# Patient Record
Sex: MALE | Race: WHITE
[De-identification: names, ages, dates, MRNs, and addresses within clinical notes are randomized per-mention and may not be internally consistent; named-entity substitution may affect disease eponyms.]

---

## 2017-06-12 ENCOUNTER — HOSPITAL ENCOUNTER (INPATIENT)
Dept: HOSPITAL 74 - YASAS | Age: 57
LOS: 5 days | Discharge: HOME | DRG: 897 | End: 2017-06-17
Attending: INTERNAL MEDICINE | Admitting: INTERNAL MEDICINE
Payer: COMMERCIAL

## 2017-06-12 VITALS — BODY MASS INDEX: 25.7 KG/M2

## 2017-06-12 DIAGNOSIS — F11.23: Primary | ICD-10-CM

## 2017-06-12 DIAGNOSIS — F19.24: ICD-10-CM

## 2017-06-12 DIAGNOSIS — Z79.84: ICD-10-CM

## 2017-06-12 DIAGNOSIS — Z79.4: ICD-10-CM

## 2017-06-12 DIAGNOSIS — F34.1: ICD-10-CM

## 2017-06-12 DIAGNOSIS — L02.413: ICD-10-CM

## 2017-06-12 DIAGNOSIS — F17.210: ICD-10-CM

## 2017-06-12 DIAGNOSIS — E78.5: ICD-10-CM

## 2017-06-12 DIAGNOSIS — L02.511: ICD-10-CM

## 2017-06-12 DIAGNOSIS — H04.129: ICD-10-CM

## 2017-06-12 DIAGNOSIS — B18.2: ICD-10-CM

## 2017-06-12 DIAGNOSIS — G47.00: ICD-10-CM

## 2017-06-12 DIAGNOSIS — I10: ICD-10-CM

## 2017-06-12 DIAGNOSIS — E11.9: ICD-10-CM

## 2017-06-12 PROCEDURE — HZ2ZZZZ DETOXIFICATION SERVICES FOR SUBSTANCE ABUSE TREATMENT: ICD-10-PCS | Performed by: INTERNAL MEDICINE

## 2017-06-12 RX ADMIN — Medication SCH MG: at 22:51

## 2017-06-12 RX ADMIN — NICOTINE SCH: 21 PATCH TRANSDERMAL at 22:55

## 2017-06-12 RX ADMIN — CEPHALEXIN SCH MG: 500 CAPSULE ORAL at 23:00

## 2017-06-12 NOTE — HP
COWS





- Scale


Resting Pulse: 1= [place] 


Sweatin= Chills/Flushing


Restless Observation: 1= Difficult to Sit Still


Pupil Size: 1= Pupils >than Normal


Bone or Joint Aches: 4=Acute Joint/Muscle Pain


Runny Nose/ Eye Tearin= Runny Nose/Eyes


GI Upset > 30mins: 1= Stomach Cramp


Tremor Observation: 2= Slight Tremor Visible


Yawning Observation: 2= >3x During Session


Anxiety or Irritability: 1=Feels Anxious/Irritable


Goose Flesh Skin: 0=Smooth Skin


COWS Score: 16





Admission ROS S





- HPI


Chief Complaint: 


C/O WITHDRAWAL SX'S. SEEKING DETOX FOR OPIATE DEPENDENCE.


Allergies/Adverse Reactions: 


 Allergies











Allergy/AdvReac Type Severity Reaction Status Date / Time


 


No Known Allergies Allergy   Verified 16 15:44











History of Present Illness: 


56 Y.O. MALE WITH H/O OPIOID DEPENDENCE ADMITTED TO DETOX. CLIENT IS KNOWN TO 

[facility]. REPORTS LAST DETOX 3 MONTHS AGO. STATES LONGEST CLEAN TIME 2 YEARS. SELF 

REFERRED.


CLIENT NOTED WITH ABCESS TO R FOREARM WITH SLIGHT NECROSIS AND REDNESS COOL TO 

TOUCH. STATES WAS SEEN IN [facility] TODAY AND STARTED ON PO ABT [place] BUT DOES NOT 

HAVE HIS DC PAPERS. PT HAS THIS WOUND LAST ADMISSION. STATES WAS TREATED AND 

RESOLVED BUT HAS NOW REOCCURRED DUE TO USING SITE FOR IVDA. THIS EPISODE IS NOW 

2 WEEKS. HE IS AFREBILE AND H/O DM. WILL START KEFLEX AND CONT TO MONITOR. D/W 

CLIENT NEED TO F/U UPON DC


Exam Limitations: No Limitations





- Ebola screening


Have you traveled outside of the country in the last 21 days: No (N)


Have you had contact with anyone from an Ebola affected area: No


Have you been sick,other than usual withdrawal symptoms: No


Do you have a fever: No





- Review of Systems


Constitutional: Chills, Loss of Appetite, Malaise, Night Sweats


EENT: reports: Tearing, Nose Congestion, Dental Problems (MISSING TEETH)


Respiratory: reports: No Symptoms reported


Cardiac: reports: No Symptoms Reported


GI: reports: Poor Appetite, Abdominal cramping


: reports: No Symptoms Reported


Musculoskeletal: reports: Joint Pain


Integumentary: reports: No Symptoms Reported


Neuro: reports: No Symptoms reported


Endocrine: reports: Other (H/O DM)


Hematology: reports: No Symptoms Reported


Psychiatric: reports: Anxious


Other Systems: Reviewed and Negative





Patient History





- Patient Medical History


Hx Anemia: No


Hx Asthma: No


Hx Chronic Obstructive Pulmonary Disease (COPD): No


Hx Cancer: No


Hx Cardiac Disorders: No


Hx Congestive Heart Failure: No


Hx Hypertension: Yes


Hx Hypercholesterolemia: Yes


Hx Pacemaker: No


HX Cerebrovascular Accident: No


Hx Seizures: No


Hx Dementia: No


Hx Diabetes: Yes


Hx Gastrointestinal Disorders: No


Hx Liver Disease: No


Hx Genitourinary Disorders: No


Hx Sexually Transmitted Disorders: No


Hx Renal Disease (ESRD): No


Hx Thyroid Disease: No


Hx Human Immunodeficiency Virus (HIV): No


Hx Hepatitis C: Yes (NON DETECTABLE)


Hx Depression: Yes


Hx Suicide Attempt: No


Hx Bipolar Disorder: No


Hx Schizophrenia: No


Other Medical History: DENIES





- Patient Surgical History


Past Surgical History: No


Hx Neurologic Surgery: No


Hx Cataract Extraction: No


Hx Cardiac Surgery: No


Hx Lung Surgery: No


Hx Breast Surgery: No


Hx Breast Biopsy: No


Hx Abdominal Surgery: No


Hx Appendectomy: No


Hx Cholecystectomy: No


Hx Genitourinary Surgery: No


Hx  Section: No


Hx Orthopedic Surgery: No


Anesthesia Reaction: No





- PPD History


Previous Implant?: Yes


Documented Results: Negative w/proof


Implanted On Prior R Admission?: Yes


Date: 16


Results: 0MM


PPD to be Administered?: No





- Smoking Cessation


Smoking history: Current every day smoker


Have you smoked in the past 12 months: Yes


Aproximately how many cigarettes per day: 10


Cigars Per Day: 0


Hx Chewing Tobacco Use: No


Initiated information on smoking cessation: Yes


'Breaking Loose' booklet given: 17





- Substance & Tx. History


Hx Alcohol Use: No


Hx Substance Use: Yes


Substance Use Type: Heroin


Hx Substance Use Treatment: Yes ([facility])





- Substances Abused


  ** HEROIN


Route: Injection


Frequency: Daily


Amount used: 3 BAGS


Age of first use: 42


Date of Last Use: 17





Family Disease History





- Family Disease History


Family Disease History: Diabetes: Father ()





Admission Physical Exam B[facility]





- Vital Signs


Vital Signs: 


 Vital Signs - 24 hr











  17





  20:19


 


Temperature 97.7 F


 


Pulse Rate 83


 


Respiratory 18





Rate 


 


Blood Pressure 108/69














- Physical


General Appearance: Yes: Tremorous


HEENTM: Yes: Other (GLASSES)


Respiratory: Yes: Chest Non-Tender, Lungs Clear, Normal Breath Sounds, No 

Respiratory Distress, No Accessory Muscle Use


Neck: Yes: No masses,lesions,Nodules, Supple, Trachea in good position


Breast: Yes: Breast Exam Deferred


Cardiology: Yes: Regular Rhythm, Regular Rate, S1, S2


Abdominal: Yes: Normal Bowel Sounds, Non Tender, Soft, Protuberent


Genitourinary: Yes: Within Normal Limits


Back: Yes: Normal Inspection


Musculoskeletal: Yes: full range of Motion, Gait Steady, Other (LIMITED ROM TO 

RIGHT HAND DIGITS)


Extremities: Yes: Non-Tender, Tremors, Other (LROM TO R HAND DIGITS)


Neurological: Yes: Fully Oriented, Alert


Integumentary: Yes: Normal Color, Clammy, Track Marks, Other (R FOREARM 

NECROTIC ABCESS. HARD NO DRAINAGE SLIGHT REDNESS)


Lymphatic: Yes: Within Normal Limits





- Diagnostic


(1) Dry eye


Current Visit: Yes   Status: Chronic





(2) Opioid dependence with withdrawal


Current Visit: Yes   Status: Chronic





(3) Hypertension


Current Visit: Yes   Status: Chronic   Qualifiers: 


     Hypertension type: essential hypertension        Qualified Code(s): I10 - 

Essential (primary) hypertension  





(4) Nicotine dependence


Current Visit: Yes   Status: Chronic   Qualifiers: 


     Nicotine product type: cigarettes     Substance use status: uncomplicated 

       Qualified Code(s): F17.210 - Nicotine dependence, cigarettes, 

uncomplicated  





(5) Type 2 diabetes mellitus


Current Visit: Yes   Status: Chronic   Qualifiers: 


     Diabetes mellitus complication status: without complication     Diabetes 

mellitus long term insulin use: with long term use        Qualified Code(s): 

E11.9 - Type 2 diabetes mellitus without complications  





(6) HLD (hyperlipidemia)


Current Visit: Yes   Status: Chronic   Qualifiers: 


     Hyperlipidemia type: unspecified        Qualified Code(s): E78.5 - 

Hyperlipidemia, unspecified  





(7) Abscess of right forearm


Current Visit: Yes   Status: Acute








Cleared for Admission [facility]





- Detox or Rehab


[facility] Level of Care: Medically Managed


Detox Regimen/Protocol: Methadone





[facility] Breath Alcohol Content


Breath Alcohol Content: 0





Urine Drug Screen





- Results


Drug Screen Negative: No


Urine Drug Screen Results: OPI-Opiates, MTD-Methadone

## 2017-06-13 LAB
ALBUMIN SERPL-MCNC: 3.9 G/DL (ref 3.4–5)
ALP SERPL-CCNC: 128 U/L (ref 45–117)
ALT SERPL-CCNC: 31 U/L (ref 12–78)
ANION GAP SERPL CALC-SCNC: 9 MMOL/L (ref 8–16)
APPEARANCE UR: CLEAR
AST SERPL-CCNC: 19 U/L (ref 15–37)
BILIRUB SERPL-MCNC: 0.7 MG/DL (ref 0.2–1)
BILIRUB UR STRIP.AUTO-MCNC: NEGATIVE MG/DL
CALCIUM SERPL-MCNC: 9.7 MG/DL (ref 8.5–10.1)
CO2 SERPL-SCNC: 28 MMOL/L (ref 21–32)
COCKROFT - GAULT: 97.57
COLOR UR: (no result)
CREAT SERPL-MCNC: 0.9 MG/DL (ref 0.7–1.3)
DEPRECATED RDW RBC AUTO: 14.3 % (ref 11.9–15.9)
GLUCOSE SERPL-MCNC: 158 MG/DL (ref 74–106)
KETONES UR QL STRIP: NEGATIVE
LEUKOCYTE ESTERASE UR QL STRIP.AUTO: NEGATIVE
MCH RBC QN AUTO: 28.4 PG (ref 25.7–33.7)
MCHC RBC AUTO-ENTMCNC: 32.6 G/DL (ref 32–35.9)
MCV RBC: 87.1 FL (ref 80–96)
NITRITE UR QL STRIP: NEGATIVE
PH UR: 5 [PH] (ref 5–8)
PLATELET # BLD AUTO: 228 K/MM3 (ref 134–434)
PMV BLD: 7.3 FL (ref 7.5–11.1)
PROT SERPL-MCNC: 7.7 G/DL (ref 6.4–8.2)
PROT UR QL STRIP: (no result)
PROT UR QL STRIP: NEGATIVE
RBC # UR STRIP: NEGATIVE /UL
UROBILINOGEN UR STRIP-MCNC: NEGATIVE E.U./DL (ref 0.2–1)
WBC # BLD AUTO: 6.3 K/MM3 (ref 4–10)

## 2017-06-13 RX ADMIN — CEPHALEXIN SCH MG: 500 CAPSULE ORAL at 23:04

## 2017-06-13 RX ADMIN — LOSARTAN POTASSIUM SCH MG: 25 TABLET, FILM COATED ORAL at 10:07

## 2017-06-13 RX ADMIN — METFORMIN HYDROCHLORIDE SCH MG: 500 TABLET ORAL at 16:47

## 2017-06-13 RX ADMIN — CEPHALEXIN SCH MG: 500 CAPSULE ORAL at 11:49

## 2017-06-13 RX ADMIN — INSULIN DETEMIR SCH UNITS: 100 INJECTION, SOLUTION SUBCUTANEOUS at 10:07

## 2017-06-13 RX ADMIN — Medication SCH TAB: at 10:07

## 2017-06-13 RX ADMIN — NICOTINE SCH: 21 PATCH TRANSDERMAL at 10:07

## 2017-06-13 RX ADMIN — METFORMIN HYDROCHLORIDE SCH MG: 500 TABLET ORAL at 08:06

## 2017-06-13 RX ADMIN — CEPHALEXIN SCH MG: 500 CAPSULE ORAL at 17:09

## 2017-06-13 RX ADMIN — CEPHALEXIN SCH MG: 500 CAPSULE ORAL at 05:22

## 2017-06-13 RX ADMIN — Medication SCH MG: at 22:10

## 2017-06-13 NOTE — EKG
Test Reason : 

Blood Pressure : ***/*** mmHG

Vent. Rate : 069 BPM     Atrial Rate : 069 BPM

   P-R Int : 142 ms          QRS Dur : 082 ms

    QT Int : 390 ms       P-R-T Axes : 043 084 069 degrees

   QTc Int : 417 ms

 

NORMAL SINUS RHYTHM

NORMAL ECG

NO PREVIOUS ECGS AVAILABLE

Confirmed by ELBA AGEE MD ([id number]) on 6/13/2017 10:58:08 AM

 

Referred By:             Confirmed By:ELBA AGEE MD

## 2017-06-13 NOTE — PN
[facility]HS COWS





- Scale


Resting Pulse: 0= [place] 80 or Below


Sweatin= Chills/Flushing


Restless Observation: 3= Extraneous Movement


Pupil Size: 2= Moderately Dilated


Bone or Joint Aches: 4=Acute Joint/Muscle Pain


Runny Nose/ Eye Tearin= Nasal Congestion


GI Upset > 30mins: 1= Stomach Cramp


Tremor Observation of Outstretched Hands: 1= Tremor Felt, Not Seen


Yawning Observation: 1= 1-2x During Session


Anxiety or Irritability: 2=Irritable/Anxious


Goose Flesh Skin: 0=Smooth Skin


COWS Score: 16





BHS Progress Note (SOAP)


Subjective: 


ANXIETY,SWEATS,INTERMITTENT SLEEP.


Objective: 





17 10:17


 Vital Signs











Temperature  97.5 F L  17 09:26


 


Pulse Rate  74   17 09:26


 


Respiratory Rate  18   17 09:26


 


Blood Pressure  116/78   17 09:26


 


O2 Sat by Pulse Oximetry (%)      








 Laboratory Last Values











WBC  6.3 K/mm3 (4.0-10.0)   17  07:00    


 


RBC  4.97 M/mm3 (4.00-5.60)   17  07:00    


 


Hgb  14.1 GM/dL (11.7-16.9)   17  07:00    


 


Hct  43.3 % (35.4-49)   17  07:00    


 


MCV  87.1 fl (80-96)   17  07:00    


 


MCHC  32.6 g/dl (32.0-35.9)   17  07:00    


 


RDW  14.3 % (11.9-15.9)   17  07:00    


 


Plt Count  228 K/MM3 (134-434)   17  07:00    


 


MPV  7.3 fl (7.5-11.1)  L D 17  07:00    


 


POC Glucometer  215 UNITS (())   17  05:24    











Assessment: 





17 10:17


WITHDRAWAL SX


Plan: 


CONTINUE DETOX

## 2017-06-14 LAB — SP GR UR: 1.02 (ref 1–1.02)

## 2017-06-14 RX ADMIN — NICOTINE SCH: 21 PATCH TRANSDERMAL at 10:04

## 2017-06-14 RX ADMIN — METFORMIN HYDROCHLORIDE SCH MG: 500 TABLET ORAL at 07:28

## 2017-06-14 RX ADMIN — CEPHALEXIN SCH MG: 500 CAPSULE ORAL at 17:09

## 2017-06-14 RX ADMIN — LOSARTAN POTASSIUM SCH MG: 25 TABLET, FILM COATED ORAL at 10:03

## 2017-06-14 RX ADMIN — Medication SCH MG: at 22:42

## 2017-06-14 RX ADMIN — CEPHALEXIN SCH MG: 500 CAPSULE ORAL at 11:04

## 2017-06-14 RX ADMIN — CEPHALEXIN SCH MG: 500 CAPSULE ORAL at 05:25

## 2017-06-14 RX ADMIN — CEPHALEXIN SCH MG: 500 CAPSULE ORAL at 23:25

## 2017-06-14 RX ADMIN — METFORMIN HYDROCHLORIDE SCH MG: 500 TABLET ORAL at 17:09

## 2017-06-14 RX ADMIN — Medication SCH TAB: at 10:03

## 2017-06-14 RX ADMIN — BACITRACIN ZINC SCH GM: 500 OINTMENT TOPICAL at 12:02

## 2017-06-14 RX ADMIN — INSULIN DETEMIR SCH UNITS: 100 INJECTION, SOLUTION SUBCUTANEOUS at 10:03

## 2017-06-14 RX ADMIN — BACITRACIN ZINC SCH GM: 500 OINTMENT TOPICAL at 22:06

## 2017-06-14 NOTE — PN
[facility] COWS





- Scale


Resting Pulse: 0= [place] 80 or Below


Sweatin= Chills/Flushing


Restless Observation: 3= Extraneous Movement


Pupil Size: 2= Moderately Dilated


Bone or Joint Aches: 2= Severe Diffuse Aches


Runny Nose/ Eye Tearin= Nasal Congestion


GI Upset > 30mins: 0= None


Tremor Observation of Outstretched Hands: 2= Slight Tremor Visible


Yawning Observation: 1= 1-2x During Session


Anxiety or Irritability: 2=Irritable/Anxious


Goose Flesh Skin: 0=Smooth Skin


COWS Score: 14





BHS Progress Note (SOAP)


Subjective: 


ANXIETY,SWEATS/CHILLS,FATIGUE


Objective: 





17 09:58


 Vital Signs











Temperature  96 F L  17 09:43


 


Pulse Rate  70   17 09:43


 


Respiratory Rate  20   17 09:43


 


Blood Pressure  130/78   17 09:43


 


O2 Sat by Pulse Oximetry (%)      








 Laboratory Last Values











WBC  6.3 K/mm3 (4.0-10.0)   17  07:00    


 


RBC  4.97 M/mm3 (4.00-5.60)   17  07:00    


 


Hgb  14.1 GM/dL (11.7-16.9)   17  07:00    


 


Hct  43.3 % (35.4-49)   17  07:00    


 


MCV  87.1 fl (80-96)   17  07:00    


 


MCHC  32.6 g/dl (32.0-35.9)   17  07:00    


 


RDW  14.3 % (11.9-15.9)   17  07:00    


 


Plt Count  228 K/MM3 (134-434)   17  07:00    


 


MPV  7.3 fl (7.5-11.1)  L D 17  07:00    


 


Sodium  136 mmol/L (136-145)   17  07:00    


 


Potassium  4.5 mmol/L (3.5-5.1)   17  07:00    


 


Chloride  99 mmol/L ()   17  07:00    


 


Carbon Dioxide  28 mmol/L (21-32)   17  07:00    


 


Anion Gap  9  (8-16)   17  07:00    


 


BUN  24 mg/dL (7-18)  H D 17  07:00    


 


Creatinine  0.9 mg/dL (0.7-1.3)   17  07:00    


 


Creat Clearance w eGFR  > 60  (>60)   17  07:00    


 


POC Glucometer  165 UNITS (())   17  05:27    


 


Random Glucose  158 mg/dL ()  H D 17  07:00    


 


Calcium  9.7 mg/dL (8.5-10.1)   17  07:00    


 


Total Bilirubin  0.7 mg/dL (0.2-1.0)   17  07:00    


 


AST  19 U/L (15-37)   17  07:00    


 


ALT  31 U/L (12-78)  D 17  07:00    


 


Alkaline Phosphatase  128 U/L ()  H  17  07:00    


 


Total Protein  7.7 g/dl (6.4-8.2)   17  07:00    


 


Albumin  3.9 g/dl (3.4-5.0)   17  07:00    


 


Urine Color  Ltyellow   17  13:23    


 


Urine Appearance  Clear   17  13:23    


 


Urine pH  5.0  (5.0-8.0)   17  13:23    


 


Urine Protein  Negative  (NEGATIVE)   17  13:23    


 


Urine Glucose (UA)  3+  (NEGATIVE)  H  17  13:23    


 


Urine Ketones  Negative  (NEGATIVE)   17  13:23    


 


Urine Blood  Negative  (NEGATIVE)   17  13:23    


 


Urine Nitrite  Negative  (NEGATIVE)   17  13:23    


 


Urine Bilirubin  Negative  (NEGATIVE)   17  13:23    


 


Urine Urobilinogen  Negative E.U./dl (0.2-1.0)   17  13:23    


 


Ur Leukocyte Esterase  Negative  (NEGATIVE)   17  13:23    


 


RPR Titer  Nonreactive  (NONREACTIVE)   17  07:00    


 


Hepatitis C Antibody  6.4 s/co ratio (0.0-0.9)  H  17  07:00    











Assessment: 





17 09:58


WITHDRAWAL SX


Plan: 


CONTINUE DETOX

## 2017-06-14 NOTE — CONSULT
[facility] Psychiatric Consult





- Data


Date of interview: 06/14/17


Admission source: [facility]


Identifying data: Another admission to [facility] for this 57 y/o  male 

seeking detox treatment on [address] for heroin dependence.Patient is single,a 

father of one,domiciled,unemployed and supported on SSD benefits.


Substance Abuse History: - Smoking Cessation.  Smoking history: Current every 

day smoker.  Have you smoked in the past 12 months: Yes.  Aproximately how many 

cigarettes per day: 10.  Cigars Per Day: 0.  Hx Chewing Tobacco Use: No.  

Initiated information on smoking cessation: Yes.  'Breaking Loose' booklet given

: 06/12/17.  - Substance & Tx. History.  Hx Alcohol Use: No.  Hx Substance Use: 

Yes.  Substance Use Type: Heroin.  Hx Substance Use Treatment: Yes ([facility]).  - 

Substances Abused.  ** HEROIN.  Route: Injection.  Frequency: Daily.  Amount 

used: 3 BAGS.  Age of first use: 42.  Date of Last Use: 06/11/17.  Confirmed by 

patient.


Medical History: Hypercholesterolemia,diabetes mellitus,hepatitis C and 

hypertension.


Psychiatric History: No reported history of psychiatric hospitalizations.Mr Kelsey reports current OPD psychiatric care at a program located on 

[facility] in the [place].Diagnosed with MDD and Anxiety 

Disorder.Currenntly maintained on klonopin and seroquel 200 mg/hs.He denies 

history of suicide attempts.


Physical/Sexual Abuse/Trauma History: Patient denies.


Additional Comment: Urine Drug Screen Results: OPI-Opiates, MTD-Methadone.Noted.





Mental Status Exam





- Mental Status Exam


Alert and Oriented to: Time, Place, Person


Cognitive Function: Good


Patient Appearance: Well Groomed


Mood: Nervous, Anxious


Affect: Mood Congruent


Patient Behavior: Fatigued, Appropriate, Cooperative


Speech Pattern: Clear


Voice Loudness: Normal


Thought Process: Goal Oriented


Thought Disorder: Not Present


Hallucinations: Denies


Suicidal Ideation: Denies


Homicidal Ideation: Denies


Insight/Judgement: Poor


Sleep: Poorly, Difficulty falling asleep


Appetite: Good


Muscle strength/Tone: Normal


Gait/Station: Normal





Psychiatric Findings





- Problem List (Axis 1, 2,3)


(1) Opioid dependence with withdrawal


Current Visit: Yes   Status: Acute





(2) Nicotine dependence


Current Visit: Yes   Status: Acute   Qualifiers: 


     Nicotine product type: cigarettes     Substance use status: uncomplicated 

       Qualified Code(s): F17.210 - Nicotine dependence, cigarettes, 

uncomplicated  





(3) Substance induced mood disorder


Current Visit: Yes   Status: Acute





(4) HLD (hyperlipidemia)


Current Visit: Yes   Status: Chronic   Qualifiers: 


     Hyperlipidemia type: unspecified        Qualified Code(s): E78.5 - 

Hyperlipidemia, unspecified  





(5) Hypertension


Current Visit: Yes   Status: Chronic   Qualifiers: 


     Hypertension type: essential hypertension        Qualified Code(s): I10 - 

Essential (primary) hypertension  





(6) Type 2 diabetes mellitus


Current Visit: Yes   Status: Chronic   Qualifiers: 


     Diabetes mellitus complication status: without complication     Diabetes 

mellitus long term insulin use: with long term use        Qualified Code(s): 

E11.9 - Type 2 diabetes mellitus without complications  





(7) Abscess of right hand


Current Visit: Yes   Status: Acute


Comment: KEFLEX








(8) Hepatitis C


Current Visit: Yes   Status: Chronic   Qualifiers: 


     Viral hepatitis chronicity: carrier        Qualified Code(s): B18.2 - 

Chronic viral hepatitis C  





(9) Insomnia


Current Visit: Yes   Status: Acute   








- Initial Treatment Plan


Initial Treatment Plan: Psychoeducation.Detoxification.Seroquel 200 mg po 

hs.Side effects/benefits discussed with patient.He is in agreement with this 

careplan.Observation.Pharmacy claims on 5/20/17 at [facility] confirmed 

this dose of seroquel.

## 2017-06-14 NOTE — CONSULT
Psychiatric Findings





- Problem List (Axis 1, 2,3)


(1) Opioid dependence with withdrawal


Current Visit: Yes   Status: Acute





(2) Nicotine dependence


Current Visit: Yes   Status: Acute   Qualifiers: 


     Nicotine product type: cigarettes     Substance use status: uncomplicated 

       Qualified Code(s): F17.210 - Nicotine dependence, cigarettes, 

uncomplicated  





(3) Substance induced mood disorder


Current Visit: Yes   Status: Acute





(4) HLD (hyperlipidemia)


Current Visit: Yes   Status: Chronic   Qualifiers: 


     Hyperlipidemia type: unspecified        Qualified Code(s): E78.5 - 

Hyperlipidemia, unspecified  





(5) Hypertension


Current Visit: Yes   Status: Chronic   Qualifiers: 


     Hypertension type: essential hypertension        Qualified Code(s): I10 - 

Essential (primary) hypertension  





(6) Type 2 diabetes mellitus


Current Visit: Yes   Status: Chronic   Qualifiers: 


     Diabetes mellitus complication status: without complication     Diabetes 

mellitus long term insulin use: with long term use        Qualified Code(s): 

E11.9 - Type 2 diabetes mellitus without complications  





(7) Abscess of right hand


Current Visit: Yes   Status: Acute


Comment: KEFLEX








(8) Hepatitis C


Current Visit: Yes   Status: Chronic   Qualifiers: 


     Viral hepatitis chronicity: carrier        Qualified Code(s): B18.2 - 

Chronic viral hepatitis C  





(9) Insomnia


Current Visit: Yes   Status: Acute

## 2017-06-15 RX ADMIN — BACITRACIN ZINC SCH GM: 500 OINTMENT TOPICAL at 22:49

## 2017-06-15 RX ADMIN — POLYVINYL ALCOHOL SCH DROP: 14 SOLUTION/ DROPS OPHTHALMIC at 13:37

## 2017-06-15 RX ADMIN — Medication SCH TAB: at 09:53

## 2017-06-15 RX ADMIN — METFORMIN HYDROCHLORIDE SCH MG: 500 TABLET ORAL at 07:10

## 2017-06-15 RX ADMIN — INSULIN DETEMIR SCH UNITS: 100 INJECTION, SOLUTION SUBCUTANEOUS at 09:52

## 2017-06-15 RX ADMIN — BACITRACIN ZINC SCH GM: 500 OINTMENT TOPICAL at 09:53

## 2017-06-15 RX ADMIN — CEPHALEXIN SCH MG: 500 CAPSULE ORAL at 17:15

## 2017-06-15 RX ADMIN — LOSARTAN POTASSIUM SCH MG: 25 TABLET, FILM COATED ORAL at 09:53

## 2017-06-15 RX ADMIN — NICOTINE SCH: 21 PATCH TRANSDERMAL at 09:54

## 2017-06-15 RX ADMIN — CEPHALEXIN SCH MG: 500 CAPSULE ORAL at 13:34

## 2017-06-15 RX ADMIN — CEPHALEXIN SCH MG: 500 CAPSULE ORAL at 05:37

## 2017-06-15 RX ADMIN — METFORMIN HYDROCHLORIDE SCH MG: 500 TABLET ORAL at 17:15

## 2017-06-15 RX ADMIN — Medication SCH MG: at 22:07

## 2017-06-15 RX ADMIN — POLYVINYL ALCOHOL SCH DROP: 14 SOLUTION/ DROPS OPHTHALMIC at 22:49

## 2017-06-16 RX ADMIN — BACITRACIN ZINC SCH GM: 500 OINTMENT TOPICAL at 22:07

## 2017-06-16 RX ADMIN — BACITRACIN ZINC SCH GM: 500 OINTMENT TOPICAL at 09:33

## 2017-06-16 RX ADMIN — POLYVINYL ALCOHOL SCH DROP: 14 SOLUTION/ DROPS OPHTHALMIC at 22:08

## 2017-06-16 RX ADMIN — CEPHALEXIN SCH MG: 500 CAPSULE ORAL at 23:08

## 2017-06-16 RX ADMIN — CEPHALEXIN SCH MG: 500 CAPSULE ORAL at 00:10

## 2017-06-16 RX ADMIN — CEPHALEXIN SCH MG: 500 CAPSULE ORAL at 05:38

## 2017-06-16 RX ADMIN — METFORMIN HYDROCHLORIDE SCH MG: 500 TABLET ORAL at 17:05

## 2017-06-16 RX ADMIN — Medication SCH TAB: at 09:34

## 2017-06-16 RX ADMIN — CEPHALEXIN SCH MG: 500 CAPSULE ORAL at 11:59

## 2017-06-16 RX ADMIN — METFORMIN HYDROCHLORIDE SCH MG: 500 TABLET ORAL at 06:15

## 2017-06-16 RX ADMIN — POLYVINYL ALCOHOL SCH: 14 SOLUTION/ DROPS OPHTHALMIC at 13:08

## 2017-06-16 RX ADMIN — INSULIN DETEMIR SCH UNITS: 100 INJECTION, SOLUTION SUBCUTANEOUS at 09:33

## 2017-06-16 RX ADMIN — NICOTINE SCH: 21 PATCH TRANSDERMAL at 09:34

## 2017-06-16 RX ADMIN — CEPHALEXIN SCH MG: 500 CAPSULE ORAL at 17:05

## 2017-06-16 RX ADMIN — LOSARTAN POTASSIUM SCH MG: 25 TABLET, FILM COATED ORAL at 10:24

## 2017-06-16 RX ADMIN — Medication SCH MG: at 22:08

## 2017-06-16 RX ADMIN — POLYVINYL ALCOHOL SCH DROP: 14 SOLUTION/ DROPS OPHTHALMIC at 05:38

## 2017-06-16 NOTE — PN
[facility] Progress Note (SOAP)


Subjective: 


ANXIETY,SWEATS,CHILLS,FATIGUE.


Objective: 





06/16/17 10:25


 Vital Signs











Temperature  99.3 F   06/16/17 09:50


 


Pulse Rate  75   06/16/17 09:50


 


Respiratory Rate  20   06/16/17 09:50


 


Blood Pressure  128/86   06/16/17 09:50


 


O2 Sat by Pulse Oximetry (%)      











Assessment: 





06/16/17 10:25


WITHDRAWAL SX


Plan: 


CONTINUE DETOX

## 2017-06-17 VITALS — DIASTOLIC BLOOD PRESSURE: 83 MMHG | SYSTOLIC BLOOD PRESSURE: 122 MMHG | TEMPERATURE: 97 F | HEART RATE: 72 BPM

## 2017-06-17 RX ADMIN — CEPHALEXIN SCH MG: 500 CAPSULE ORAL at 05:32

## 2017-06-17 RX ADMIN — METFORMIN HYDROCHLORIDE SCH MG: 500 TABLET ORAL at 06:05

## 2017-06-17 RX ADMIN — POLYVINYL ALCOHOL SCH DROP: 14 SOLUTION/ DROPS OPHTHALMIC at 05:32

## 2017-06-17 NOTE — DS
[facility] Detox Discharge Summary


Admission Date: 


06/12/17





Discharge Date: 06/17/17





- History


Present History: Opioid Dependence


Additional Comments: 


PATIENT ADVISED TO COMPLETE FULL COURSE OF ANTIBIOTIC (KELFLEX, PRESCRIPTION 

SENT TO PATIENT'S PHARMACY) FOR ABSCESS OF RIGHT ARM AFTER DISCHARGE. ADVISED 

PATIENT TO FOLLOW-UP WITH  AFTER DISCHARGE FROM DETOX FOR GENERAL MEDICAL 

ASSESSMENT AND FOR FOLLOW-UP MEDICAL EVALUATION OF ABSCESS OF RIGHT FOREARM.


Pertinent Past History: 


Hyperlipidemia, HTN, Depression, Hep C, Abscess of Right Forearm and Hand, Type 

II DM.





- Physical Exam Results


Vital Signs: 


 Vital Signs











Temperature  97.0 F L  06/17/17 06:33


 


Pulse Rate  72   06/17/17 06:33


 


Respiratory Rate  18   06/17/17 06:33


 


Blood Pressure  122/83   06/17/17 06:33


 


O2 Sat by Pulse Oximetry (%)      











Pertinent Admission Physical Exam Findings: 


WITHDRAWAL SYMPTOMS.





 Laboratory Tests











  06/12/17 06/12/17 06/13/17





  07:00 20:59 05:24


 


WBC   


 


RBC   


 


Hgb   


 


Hct   


 


MCV   


 


MCHC   


 


RDW   


 


Plt Count   


 


MPV   


 


Sodium   


 


Potassium   


 


Chloride   


 


Carbon Dioxide   


 


Anion Gap   


 


BUN   


 


Creatinine   


 


Creat Clearance w eGFR   


 


POC Glucometer   148  215


 


Random Glucose   


 


Calcium   


 


Total Bilirubin   


 


AST   


 


ALT   


 


Alkaline Phosphatase   


 


Total Protein   


 


Albumin   


 


Urine Color   


 


Urine Appearance   


 


Urine pH   


 


Ur Specific Gravity   


 


Urine Protein   


 


Urine Glucose (UA)   


 


Urine Ketones   


 


Urine Blood   


 


Urine Nitrite   


 


Urine Bilirubin   


 


Urine Urobilinogen   


 


Ur Leukocyte Esterase   


 


RPR Titer   


 


Hepatitis C Antibody  6.4 H  














  06/13/17 06/13/17 06/13/17





  07:00 07:00 07:00


 


WBC  6.3  


 


RBC  4.97  


 


Hgb  14.1  


 


Hct  43.3  


 


MCV  87.1  


 


MCHC  32.6  


 


RDW  14.3  


 


Plt Count  228  


 


MPV  7.3 L D  


 


Sodium   136 


 


Potassium   4.5 


 


Chloride   99 


 


Carbon Dioxide   28 


 


Anion Gap   9 


 


BUN   24 H D 


 


Creatinine   0.9 


 


Creat Clearance w eGFR   > 60 


 


POC Glucometer   


 


Random Glucose   158 H D 


 


Calcium   9.7 


 


Total Bilirubin   0.7 


 


AST   19 


 


ALT   31  D 


 


Alkaline Phosphatase   128 H 


 


Total Protein   7.7 


 


Albumin   3.9 


 


Urine Color   


 


Urine Appearance   


 


Urine pH   


 


Ur Specific Gravity   


 


Urine Protein   


 


Urine Glucose (UA)   


 


Urine Ketones   


 


Urine Blood   


 


Urine Nitrite   


 


Urine Bilirubin   


 


Urine Urobilinogen   


 


Ur Leukocyte Esterase   


 


RPR Titer    Nonreactive


 


Hepatitis C Antibody   














  06/13/17 06/13/17 06/14/17





  13:23 16:15 05:27


 


WBC   


 


RBC   


 


Hgb   


 


Hct   


 


MCV   


 


MCHC   


 


RDW   


 


Plt Count   


 


MPV   


 


Sodium   


 


Potassium   


 


Chloride   


 


Carbon Dioxide   


 


Anion Gap   


 


BUN   


 


Creatinine   


 


Creat Clearance w eGFR   


 


POC Glucometer   159  165


 


Random Glucose   


 


Calcium   


 


Total Bilirubin   


 


AST   


 


ALT   


 


Alkaline Phosphatase   


 


Total Protein   


 


Albumin   


 


Urine Color  Ltyellow  


 


Urine Appearance  Clear  


 


Urine pH  5.0  


 


Ur Specific Gravity  1.025  


 


Urine Protein  Negative  


 


Urine Glucose (UA)  3+ H  


 


Urine Ketones  Negative  


 


Urine Blood  Negative  


 


Urine Nitrite  Negative  


 


Urine Bilirubin  Negative  


 


Urine Urobilinogen  Negative  


 


Ur Leukocyte Esterase  Negative  


 


RPR Titer   


 


Hepatitis C Antibody   














  06/14/17 06/15/17 06/15/17





  16:14 05:39 16:17


 


WBC   


 


RBC   


 


Hgb   


 


Hct   


 


MCV   


 


MCHC   


 


RDW   


 


Plt Count   


 


MPV   


 


Sodium   


 


Potassium   


 


Chloride   


 


Carbon Dioxide   


 


Anion Gap   


 


BUN   


 


Creatinine   


 


Creat Clearance w eGFR   


 


POC Glucometer  246  175  230


 


Random Glucose   


 


Calcium   


 


Total Bilirubin   


 


AST   


 


ALT   


 


Alkaline Phosphatase   


 


Total Protein   


 


Albumin   


 


Urine Color   


 


Urine Appearance   


 


Urine pH   


 


Ur Specific Gravity   


 


Urine Protein   


 


Urine Glucose (UA)   


 


Urine Ketones   


 


Urine Blood   


 


Urine Nitrite   


 


Urine Bilirubin   


 


Urine Urobilinogen   


 


Ur Leukocyte Esterase   


 


RPR Titer   


 


Hepatitis C Antibody   














  06/16/17 06/16/17 06/17/17





  05:40 16:28 05:34


 


WBC   


 


RBC   


 


Hgb   


 


Hct   


 


MCV   


 


MCHC   


 


RDW   


 


Plt Count   


 


MPV   


 


Sodium   


 


Potassium   


 


Chloride   


 


Carbon Dioxide   


 


Anion Gap   


 


BUN   


 


Creatinine   


 


Creat Clearance w eGFR   


 


POC Glucometer  296  257  169


 


Random Glucose   


 


Calcium   


 


Total Bilirubin   


 


AST   


 


ALT   


 


Alkaline Phosphatase   


 


Total Protein   


 


Albumin   


 


Urine Color   


 


Urine Appearance   


 


Urine pH   


 


Ur Specific Gravity   


 


Urine Protein   


 


Urine Glucose (UA)   


 


Urine Ketones   


 


Urine Blood   


 


Urine Nitrite   


 


Urine Bilirubin   


 


Urine Urobilinogen   


 


Ur Leukocyte Esterase   


 


RPR Titer   


 


Hepatitis C Antibody   








LABS NOTED.





- Treatment


Hospital Course: Detox Protocol Followed, Detoxed Safely, Responded well, 

Discharged Condition Good


Patient has Accepted a Rehab Referral to: PATIENT TO GO HOME. WILL ATTEND [facility] OUTPATIENT PROGRAM.





- Medication


Discharge Medications: 


Ambulatory Orders





Insulin Glargine,Hum.rec.anlog [Lantus (10mL VIAL) -] 16 units SQ DAILY 11/25/ 16 


Losartan Potassium [Cozaar -] 25 mg PO DAILY 11/25/16 


Metformin HCl [Glucophage -] 500 mg PO BID 11/25/16 


Quirino/Polymyx B Sulf/Dexameth [Maxitrol Eye Drops -] 1 drop .ROUTE BID 11/25/16 


Quetiapine Fumarate [Seroquel -] 200 mg PO HS #30 tab 06/14/17 


Cephalexin [Keflex] 500 mg PO QID #28 capsule 06/17/17 











- Diagnosis


(1) Abscess of right forearm


Status: Acute





(2) Abscess of right hand


Status: Acute





(3) Insomnia


Status: Acute   Qualifiers: 


     Insomnia type: unspecified        Qualified Code(s): G47.00 - Insomnia, 

unspecified  





(4) Nicotine dependence


Status: Chronic   Qualifiers: 


     Nicotine product type: cigarettes     Substance use status: uncomplicated 

       Qualified Code(s): F17.210 - Nicotine dependence, cigarettes, 

uncomplicated  





(5) Opioid dependence with withdrawal


Status: Acute





(6) Substance induced mood disorder


Status: Acute





(7) Dry eye


Status: Chronic





(8) HLD (hyperlipidemia)


Status: Chronic   Qualifiers: 


     Hyperlipidemia type: unspecified        Qualified Code(s): E78.5 - 

Hyperlipidemia, unspecified  





(9) Hepatitis C


Status: Chronic   Qualifiers: 


     Viral hepatitis chronicity: carrier        Qualified Code(s): B18.2 - 

Chronic viral hepatitis C  





(10) Hypertension


Status: Chronic   Qualifiers: 


     Hypertension type: essential hypertension        Qualified Code(s): I10 - 

Essential (primary) hypertension  





(11) Type 2 diabetes mellitus


Status: Chronic   Qualifiers: 


     Diabetes mellitus complication status: without complication     Diabetes 

mellitus long term insulin use: with long term use        Qualified Code(s): 

E11.9 - Type 2 diabetes mellitus without complications  





(12) Depression (emotion)


Status: Suspected   Qualifiers: 


     Depression Type: dysthymia        Qualified Code(s): F34.1 - Dysthymic 

disorder  








- AMA


Did Patient Leave Against Medical Advice: No

## 2017-11-21 ENCOUNTER — HOSPITAL ENCOUNTER (INPATIENT)
Dept: HOSPITAL 74 - YASAS | Age: 57
LOS: 4 days | Discharge: HOME | DRG: 897 | End: 2017-11-25
Attending: INTERNAL MEDICINE | Admitting: INTERNAL MEDICINE
Payer: COMMERCIAL

## 2017-11-21 VITALS — BODY MASS INDEX: 25 KG/M2

## 2017-11-21 DIAGNOSIS — E78.5: ICD-10-CM

## 2017-11-21 DIAGNOSIS — Z79.4: ICD-10-CM

## 2017-11-21 DIAGNOSIS — F17.210: ICD-10-CM

## 2017-11-21 DIAGNOSIS — H04.129: ICD-10-CM

## 2017-11-21 DIAGNOSIS — F34.1: ICD-10-CM

## 2017-11-21 DIAGNOSIS — F11.23: Primary | ICD-10-CM

## 2017-11-21 DIAGNOSIS — E11.9: ICD-10-CM

## 2017-11-21 DIAGNOSIS — G47.00: ICD-10-CM

## 2017-11-21 DIAGNOSIS — Z79.84: ICD-10-CM

## 2017-11-21 DIAGNOSIS — H10.33: ICD-10-CM

## 2017-11-21 DIAGNOSIS — F19.24: ICD-10-CM

## 2017-11-21 DIAGNOSIS — I10: ICD-10-CM

## 2017-11-21 DIAGNOSIS — B18.2: ICD-10-CM

## 2017-11-21 LAB
APPEARANCE UR: CLEAR
BILIRUB UR STRIP.AUTO-MCNC: NEGATIVE MG/DL
COLOR UR: YELLOW
KETONES UR QL STRIP: NEGATIVE
NITRITE UR QL STRIP: NEGATIVE
PH UR: 5 [PH] (ref 5–8)
PROT UR QL STRIP: NEGATIVE
PROT UR QL STRIP: NEGATIVE
RBC # UR STRIP: NEGATIVE /UL
SP GR UR: 1.02 (ref 1–1.03)
UROBILINOGEN UR STRIP-MCNC: NEGATIVE MG/DL (ref 0.2–1)

## 2017-11-21 PROCEDURE — HZ2ZZZZ DETOXIFICATION SERVICES FOR SUBSTANCE ABUSE TREATMENT: ICD-10-PCS | Performed by: INTERNAL MEDICINE

## 2017-11-21 RX ADMIN — NICOTINE SCH: 21 PATCH TRANSDERMAL at 18:48

## 2017-11-21 RX ADMIN — METFORMIN HYDROCHLORIDE SCH MG: 500 TABLET ORAL at 18:47

## 2017-11-21 RX ADMIN — CIPROFLOXACIN HYDROCHLORIDE SCH: 3 SOLUTION/ DROPS OPHTHALMIC at 18:51

## 2017-11-21 RX ADMIN — Medication SCH MG: at 22:19

## 2017-11-21 RX ADMIN — CIPROFLOXACIN HYDROCHLORIDE SCH DROP: 3 SOLUTION/ DROPS OPHTHALMIC at 22:19

## 2017-11-21 RX ADMIN — CIPROFLOXACIN HYDROCHLORIDE SCH DROP: 3 SOLUTION/ DROPS OPHTHALMIC at 18:51

## 2017-11-21 NOTE — HP
COWS





- Scale


Resting Pulse: 1= HI 


Sweatin=Flushed/Facial Moisture


Restless Observation: 3= Extraneous Movement


Pupil Size: 2= Moderately Dilated


Bone or Joint Aches: 2= Severe Diffuse Aches


Runny Nose/ Eye Tearin= Runny Nose/Eyes


GI Upset > 30mins: 3= Vomiting/Diarrhea


Tremor Observation: 2= Slight Tremor Visible


Yawning Observation: 2= >3x During Session


Anxiety or Irritability: 2=Irritable/Anxious


Goose Flesh Skin: 0=Smooth Skin


COWS Score: 21





Admission ROS [facility]





- HPI


Chief Complaint: 





i need help to stop using heroin


Allergies/Adverse Reactions: 


 Allergies











Allergy/AdvReac Type Severity Reaction Status Date / Time


 


No Known Allergies Allergy   Verified 17 15:26











History of Present Illness: 





this 57 years old male with heroin dependence,seeking detox,last treatment [facility] 


bipolar disorder


type 2 dm


conjunctivitis both


longest period of sobriety 5 years


Exam Limitations: No Limitations





- Ebola screening


Have you traveled outside of the country in the last 21 days: No (N)


Have you had contact with anyone from an Ebola affected area: No


Have you been sick,other than usual withdrawal symptoms: No


Do you have a fever: No





- Review of Systems


Constitutional: Chills, Diaphoresis, Loss of Appetite, Malaise, Night Sweats, 

Weakness, Unintentional Wgt. Loss, Unexplained wgt Loss


EENT: reports: Tearing, Nose Congestion


Respiratory: reports: No Symptoms reported


Cardiac: reports: Palpitations


GI: reports: Diarrhea, Nausea, Vomiting, Abdominal cramping


: reports: No Symptoms Reported


Musculoskeletal: reports: Back Pain, Muscle Pain


Integumentary: reports: Dryness


Neuro: reports: Headache, Tremors


Endocrine: reports: No Symptoms Reported


Hematology: reports: No Symptoms Reported


Psychiatric: reports: No Sypmtoms Reported, Judgement Intact, Mood/Affect 

Appropiate, other (bipolar disorder)


Other Systems: Reviewed and Negative





Patient History





- Patient Medical History


Hx Anemia: No


Hx Asthma: No


Hx Chronic Obstructive Pulmonary Disease (COPD): No


Hx Cancer: No


Hx Cardiac Disorders: No


Hx Congestive Heart Failure: No


Hx Hypertension: Yes (on med)


Hx Hypercholesterolemia: Yes (on med)


Hx Pacemaker: No


HX Cerebrovascular Accident: No


Hx Seizures: No


Hx Dementia: No


Hx Diabetes: Yes (on med)


Hx Gastrointestinal Disorders: No


Hx Liver Disease: No


Hx Genitourinary Disorders: No


Hx Sexually Transmitted Disorders: No


Hx Renal Disease (ESRD): No


Hx Thyroid Disease: No


Hx Human Immunodeficiency Virus (HIV): No


Hx Hepatitis C: Yes (NON DETECTABLE)


Hx Depression: Yes (on med)


Hx Suicide Attempt: No


Hx Bipolar Disorder: No


Hx Schizophrenia: No


Other Medical History: no sucidal,no homicidal,redness both eyes for 3 days





- Patient Surgical History


Past Surgical History: No


Hx Neurologic Surgery: No


Hx Cataract Extraction: No


Hx Cardiac Surgery: No


Hx Lung Surgery: No


Hx Breast Surgery: No


Hx Breast Biopsy: No


Hx Abdominal Surgery: No


Hx Appendectomy: No


Hx Cholecystectomy: No


Hx Genitourinary Surgery: No


Hx  Section: No


Hx Orthopedic Surgery: No


Anesthesia Reaction: No





- PPD History


Previous Implant?: Yes


Documented Results: Negative w/o proof


Date: 16


Results: 0MM


PPD to be Administered?: Yes





- Smoking Cessation


Smoking history: Current every day smoker


Have you smoked in the past 12 months: Yes


Aproximately how many cigarettes per day: 10


Cigars Per Day: 0


Hx Chewing Tobacco Use: No


Initiated information on smoking cessation: Yes


'Breaking Loose' booklet given: 17





- Substance & Tx. History


Hx Alcohol Use: No


Hx Substance Use: Yes


Substance Use Type: Heroin


Hx Substance Use Treatment: Yes ([facility] 10/17)





- Substances Abused


  ** Heroin


Route: Injection


Frequency: Daily


Amount used: 6 BAGS


Age of first use: 42


Date of Last Use: 17





Family Disease History





- Family Disease History


Family Disease History: Diabetes: Father ()





Admission Physical Exam [facility]





- Vital Signs


Vital Signs: 


 Vital Signs - 24 hr











  17





  15:04


 


Temperature 97.8 F


 


Pulse Rate 83


 


Respiratory 18





Rate 


 


Blood Pressure 130/78














- Physical


General Appearance: Yes: Moderate Distress, Tremorous, Irritable, Sweating, 

Anxious


HEENTM: Yes: Normal ENT Inspection, VA, Pharynx Normal


Respiratory: Yes: Lungs Clear, Normal Breath Sounds, No Respiratory Distress


Neck: Yes: Within Normal Limits, Supple, Trachea in good position


Breast: Yes: Within Normal Limits


Cardiology: Yes: Within Normal Limits, Regular Rhythm, Regular Rate, S1, S2


Abdominal: Yes: Within Normal Limits, Normal Bowel Sounds, Non Tender, Flat, 

Soft


Genitourinary: Yes: Within Normal Limits


Back: Yes: Normal Inspection, Muscle Spasm


Musculoskeletal: Yes: full range of Motion, Back pain, Muscle Pain


Extremities: Yes: Within Normal Limits, Normal Range of Motion, Tremors


Neurological: Yes: CNs II-XII NML intact, Fully Oriented, Alert, Motor Strength 

5/5


Integumentary: Yes: Dry, Track Marks


Lymphatic: Yes: Within Normal Limits





- Diagnostic


(1) Insomnia


Current Visit: No   Status: Acute   


Qualifiers: 


   Insomnia type: unspecified   Qualified Code(s): G47.00 - Insomnia, 

unspecified   





(2) Opioid dependence with withdrawal


Current Visit: No   Status: Acute   





(3) HLD (hyperlipidemia)


Current Visit: No   Status: Chronic   


Qualifiers: 


   Hyperlipidemia type: unspecified   Qualified Code(s): E78.5 - Hyperlipidemia

, unspecified   





(4) Hepatitis C


Current Visit: No   Status: Chronic   


Qualifiers: 


   Viral hepatitis chronicity: carrier   Qualified Code(s): B18.2 - Chronic 

viral hepatitis C   





(5) Hypertension


Current Visit: No   Status: Chronic   


Qualifiers: 


   Hypertension type: essential hypertension   Qualified Code(s): I10 - 

Essential (primary) hypertension   





(6) Nicotine dependence


Current Visit: No   Status: Chronic   


Qualifiers: 


   Nicotine product type: cigarettes   Substance use status: uncomplicated   

Qualified Code(s): F17.210 - Nicotine dependence, cigarettes, uncomplicated   





(7) Type 2 diabetes mellitus


Current Visit: No   Status: Chronic   


Qualifiers: 


   Diabetes mellitus complication status: without complication   Diabetes 

mellitus long term insulin use: with long term use   Qualified Code(s): E11.9 - 

Type 2 diabetes mellitus without complications; Z79.4 - Long term (current) use 

of insulin; Z79.4 - Long term (current) use of insulin; Z79.4 - Long term (

current) use of insulin; Z79.4 - Long term (current) use of insulin   





(8) Depression (emotion)


Current Visit: No   Status: Suspected   


Qualifiers: 


   Depression Type: dysthymia   Qualified Code(s): F34.1 - Dysthymic disorder   





(9) Conjunctivitis, both eyes


Current Visit: Yes   Status: Acute   





Cleared for Admission [facility]





- Detox or Rehab


[facility] Level of Care: Medically Managed


Detox Regimen/Protocol: Methadone





S Breath Alcohol Content


Breath Alcohol Content: 0





Urine Drug Screen





- Results


Drug Screen Negative: No


Urine Drug Screen Results: OPI-Opiates

## 2017-11-22 LAB
ALBUMIN SERPL-MCNC: 3.1 G/DL (ref 3.4–5)
ALP SERPL-CCNC: 118 U/L (ref 45–117)
ALT SERPL-CCNC: 25 U/L (ref 12–78)
ANION GAP SERPL CALC-SCNC: 6 MMOL/L (ref 8–16)
AST SERPL-CCNC: 14 U/L (ref 15–37)
BILIRUB SERPL-MCNC: 0.5 MG/DL (ref 0.2–1)
CALCIUM SERPL-MCNC: 8.7 MG/DL (ref 8.5–10.1)
CO2 SERPL-SCNC: 27 MMOL/L (ref 21–32)
CREAT SERPL-MCNC: 0.7 MG/DL (ref 0.7–1.3)
DEPRECATED RDW RBC AUTO: 13.7 % (ref 11.9–15.9)
GLUCOSE SERPL-MCNC: 109 MG/DL (ref 74–106)
MCH RBC QN AUTO: 27.7 PG (ref 25.7–33.7)
MCHC RBC AUTO-ENTMCNC: 32 G/DL (ref 32–35.9)
MCV RBC: 86.6 FL (ref 80–96)
PLATELET # BLD AUTO: 240 K/MM3 (ref 134–434)
PMV BLD: 8.1 FL (ref 7.5–11.1)
PROT SERPL-MCNC: 6.6 G/DL (ref 6.4–8.2)
WBC # BLD AUTO: 5.8 K/MM3 (ref 4–10)

## 2017-11-22 RX ADMIN — CIPROFLOXACIN HYDROCHLORIDE SCH DROP: 3 SOLUTION/ DROPS OPHTHALMIC at 14:27

## 2017-11-22 RX ADMIN — CIPROFLOXACIN HYDROCHLORIDE SCH DROP: 3 SOLUTION/ DROPS OPHTHALMIC at 05:20

## 2017-11-22 RX ADMIN — Medication SCH MG: at 22:28

## 2017-11-22 RX ADMIN — METFORMIN HYDROCHLORIDE SCH MG: 500 TABLET ORAL at 17:14

## 2017-11-22 RX ADMIN — LOSARTAN POTASSIUM SCH MG: 25 TABLET, FILM COATED ORAL at 10:28

## 2017-11-22 RX ADMIN — METFORMIN HYDROCHLORIDE SCH MG: 500 TABLET ORAL at 06:03

## 2017-11-22 RX ADMIN — CIPROFLOXACIN HYDROCHLORIDE SCH DROP: 3 SOLUTION/ DROPS OPHTHALMIC at 10:30

## 2017-11-22 RX ADMIN — CIPROFLOXACIN HYDROCHLORIDE SCH DROP: 3 SOLUTION/ DROPS OPHTHALMIC at 22:28

## 2017-11-22 RX ADMIN — Medication SCH TAB: at 10:28

## 2017-11-22 RX ADMIN — INSULIN DETEMIR SCH UNITS: 100 INJECTION, SOLUTION SUBCUTANEOUS at 07:25

## 2017-11-22 RX ADMIN — NICOTINE SCH MG: 21 PATCH TRANSDERMAL at 10:29

## 2017-11-22 RX ADMIN — CIPROFLOXACIN HYDROCHLORIDE SCH DROP: 3 SOLUTION/ DROPS OPHTHALMIC at 17:14

## 2017-11-22 NOTE — EKG
Test Reason : 

Blood Pressure : ***/*** mmHG

Vent. Rate : 066 BPM     Atrial Rate : 066 BPM

   P-R Int : 170 ms          QRS Dur : 080 ms

    QT Int : 406 ms       P-R-T Axes : 059 084 075 degrees

   QTc Int : 425 ms

 

NORMAL SINUS RHYTHM WITH SINUS ARRHYTHMIA

NORMAL ECG

WHEN COMPARED WITH ECG OF 12-JUN-2017 21:58,

NO SIGNIFICANT CHANGE WAS FOUND

Confirmed by ANMOL BEJARANO MD ([id number]) on 11/22/2017 10:16:54 AM

 

Referred By:             Confirmed By:ANMOL BEJARANO MD

## 2017-11-22 NOTE — CONSULT
[facility] Psychiatric Consult





- Data


Date of interview: 11/22/17


Admission source: [facility]


Identifying data: Readmission to [facility] for this 56 y/o  male 

seeking detox treatment on [address] for heroin dependence.Patient is single,a 

father of one,domiciled,unemployed and supported on SSD benefits.


Substance Abuse History: Confirmed by patient in this interview.Refer to [facility] 

report for details.  Smoking history: Current every day smoker.  Have you 

smoked in the past 12 months: Yes.  Aproximately how many cigarettes per day: 

10.  Cigars Per Day: 0.  Hx Chewing Tobacco Use: No.  Initiated information on 

smoking cessation: Yes.  'Breaking Loose' booklet given: 11/21/17.  - Substance 

& Tx. History.  Hx Alcohol Use: No.  Hx Substance Use: Yes.  Substance Use Type

: Heroin.  Hx Substance Use Treatment: Yes ([facility] 10/17).  - Substances 

Abused.  ** Heroin.  Route: Injection.  Frequency: Daily.  Amount used: 6 BAGS.

  Age of first use: 42.  Date of Last Use: 11/20/17


Medical History: Hypercholesterolemia,diabetes mellitus,hepatitis C and 

hypertension.


Psychiatric History: In this interview,the patient admits to a history of 

multiple psychiatric hospitalizations ([facility]).Mr Low reports current [facility] psychiatric care in the [place].Name of 

program : not recalled by patient.Diagnosed with Bipolar Disorder.Prescribed 

klonopin and seroquel 200 mg/hs.Patient denies history of suicide attempts.


Physical/Sexual Abuse/Trauma History: No reported history of abuse.


Additional Comment: Urine Drug Screen Results: OPI-Opiates.Noted.





Mental Status Exam





- Mental Status Exam


Alert and Oriented to: Time, Place, Person


Cognitive Function: Good


Patient Appearance: Well Groomed


Mood: Hopeful, Euthymic


Affect: Appropriate, Normal Range


Patient Behavior: Appropriate, Cooperative


Speech Pattern: Clear


Voice Loudness: Normal


Thought Process: Intact, Goal Oriented


Thought Disorder: Not Present


Hallucinations: Denies


Suicidal Ideation: Denies


Homicidal Ideation: Denies


Insight/Judgement: Poor


Sleep: Poorly, Difficulty falling asleep


Appetite: Good


Muscle strength/Tone: Normal


Gait/Station: Normal





Psychiatric Findings





- Problem List (Axis 1, 2,3)


(1) Opioid dependence with withdrawal


Current Visit: Yes   Status: Acute   





(2) Nicotine dependence


Current Visit: Yes   Status: Chronic   


Qualifiers: 


   Nicotine product type: cigarettes   Substance use status: uncomplicated   

Qualified Code(s): F17.210 - Nicotine dependence, cigarettes, uncomplicated   





(3) Substance induced mood disorder


Current Visit: Yes   Status: Acute   





(4) Insomnia


Current Visit: Yes   Status: Acute   


Qualifiers: 


   Insomnia type: unspecified   Qualified Code(s): G47.00 - Insomnia, 

unspecified   





- Initial Treatment Plan


Initial Treatment Plan: Psychoeducation.Detoxification.Sleep 

hygiene.Medications : seroquel 200 mg po hs.Side effects/benefits discussed 

with patient.He agrees with careplan.Observation.

## 2017-11-22 NOTE — PN
[facility] CIWA





- CIWA Score


Nausea/Vomitin-No Nausea/No Vomiting


Muscle Tremors: 3


Anxiety: 4-Mod. Anxious/Guarded


Agitation: 3


Paroxysmal Sweats: 3


Orientation: 0-Oriented


Tacttile Disturbances: 1-Very Mild Itch/Numbness


Auditory Disturbances: 0-None


Visual Disturbances: 2-Mild Sensitivity


Headache: 0-None Present


CIWA-Ar Total Score: 16





[facility] Progress Note (SOAP)


Subjective: 





Interrupted Sleep, Fatigue, Anxious, Tremors.


Objective: 


PT. A & O X 3, OBSERVED AMBULATING ON UNIT. NO ACUTE DISTRESS.





17 11:22


 Vital Signs











Temperature  96.2 F L  17 09:40


 


Pulse Rate  76   17 09:40


 


Respiratory Rate  18   17 09:40


 


Blood Pressure  124/80   17 09:40


 


O2 Sat by Pulse Oximetry (%)      








 Laboratory Tests











  17





  15:39 22:00 05:18


 


WBC   


 


RBC   


 


Hgb   


 


Hct   


 


MCV   


 


MCH   


 


MCHC   


 


RDW   


 


Plt Count   


 


MPV   


 


Sodium   


 


Potassium   


 


Chloride   


 


Carbon Dioxide   


 


Anion Gap   


 


BUN   


 


Creatinine   


 


Creat Clearance w eGFR   


 


POC Glucometer  121   147


 


Random Glucose   


 


Calcium   


 


Total Bilirubin   


 


AST   


 


ALT   


 


Alkaline Phosphatase   


 


Total Protein   


 


Albumin   


 


Urine Color   Yellow 


 


Urine Appearance   Clear 


 


Urine pH   5.0 


 


Ur Specific Gravity   1.025 


 


Urine Protein   Negative 


 


Urine Glucose (UA)   Negative 


 


Urine Ketones   Negative 


 


Urine Blood   Negative 


 


Urine Nitrite   Negative 


 


Urine Bilirubin   Negative 


 


Urine Urobilinogen   Negative 


 


Ur Leukocyte Esterase   Negative 


 


RPR Titer   














  17





  07:00 07:00 07:00


 


WBC  5.8  


 


RBC  4.60  


 


Hgb  12.7  


 


Hct  39.8  


 


MCV  86.6  


 


MCH  27.7  


 


MCHC  32.0  


 


RDW  13.7  


 


Plt Count  240  


 


MPV  8.1  D  


 


Sodium   139 


 


Potassium   4.1 


 


Chloride   106 


 


Carbon Dioxide   27 


 


Anion Gap   6 L 


 


BUN   12  D 


 


Creatinine   0.7  D 


 


Creat Clearance w eGFR   > 60 


 


POC Glucometer   


 


Random Glucose   109 H D 


 


Calcium   8.7 


 


Total Bilirubin   0.5  D 


 


AST   14 L D 


 


ALT   25 


 


Alkaline Phosphatase   118 H 


 


Total Protein   6.6 


 


Albumin   3.1 L D 


 


Urine Color   


 


Urine Appearance   


 


Urine pH   


 


Ur Specific Gravity   


 


Urine Protein   


 


Urine Glucose (UA)   


 


Urine Ketones   


 


Urine Blood   


 


Urine Nitrite   


 


Urine Bilirubin   


 


Urine Urobilinogen   


 


Ur Leukocyte Esterase   


 


RPR Titer    Nonreactive








LABS NOTED.


Assessment: 





17 11:23


WITHDRAWAL SYMPTOMS.


Plan: 





CONTINUE DETOX.


INCREASE DAILY PO FLUID INTAKE.


PATIENT REPORTS THAT HE HAS IMPORTANT MEDICAL APPOINTMENT TO ATTEND ON SATURDAY

, 2017. AT PATIENT'S REQUEST, CURRENT DETOX REGIMEN (METHADONE) MODIFIED 

SO THAT PATIENT MAY BE DISCHARGED ON 2017.

## 2017-11-23 RX ADMIN — HYDROXYZINE PAMOATE PRN MG: 25 CAPSULE ORAL at 17:13

## 2017-11-23 RX ADMIN — CIPROFLOXACIN HYDROCHLORIDE SCH DROP: 3 SOLUTION/ DROPS OPHTHALMIC at 10:22

## 2017-11-23 RX ADMIN — CIPROFLOXACIN HYDROCHLORIDE SCH DROP: 3 SOLUTION/ DROPS OPHTHALMIC at 17:10

## 2017-11-23 RX ADMIN — METFORMIN HYDROCHLORIDE SCH MG: 500 TABLET ORAL at 07:00

## 2017-11-23 RX ADMIN — CIPROFLOXACIN HYDROCHLORIDE SCH DROP: 3 SOLUTION/ DROPS OPHTHALMIC at 13:47

## 2017-11-23 RX ADMIN — Medication SCH TAB: at 10:22

## 2017-11-23 RX ADMIN — HYDROXYZINE PAMOATE PRN MG: 25 CAPSULE ORAL at 22:18

## 2017-11-23 RX ADMIN — CIPROFLOXACIN HYDROCHLORIDE SCH DROP: 3 SOLUTION/ DROPS OPHTHALMIC at 05:40

## 2017-11-23 RX ADMIN — METFORMIN HYDROCHLORIDE SCH MG: 500 TABLET ORAL at 17:10

## 2017-11-23 RX ADMIN — INSULIN DETEMIR SCH UNITS: 100 INJECTION, SOLUTION SUBCUTANEOUS at 07:26

## 2017-11-23 RX ADMIN — Medication SCH MG: at 22:18

## 2017-11-23 RX ADMIN — LOSARTAN POTASSIUM SCH MG: 25 TABLET, FILM COATED ORAL at 10:23

## 2017-11-23 RX ADMIN — CIPROFLOXACIN HYDROCHLORIDE SCH DROP: 3 SOLUTION/ DROPS OPHTHALMIC at 22:18

## 2017-11-23 RX ADMIN — NICOTINE SCH MG: 21 PATCH TRANSDERMAL at 10:23

## 2017-11-23 NOTE — PN
[facility] COWS





- Scale


Resting Pulse: 0= [place] 80 or Below


Sweatin= Chills/Flushing


Restless Observation: 1= Difficult to Sit Still


Pupil Size: 0= Normal to Room Light


Bone or Joint Aches: 2= Severe Diffuse Aches


Runny Nose/ Eye Tearin= Nasal Congestion


GI Upset > 30mins: 0= None


Tremor Observation of Outstretched Hands: 2= Slight Tremor Visible


Yawning Observation: 1= 1-2x During Session


Anxiety or Irritability: 2=Irritable/Anxious


Goose Flesh Skin: 3=Piloerection


COWS Score: 13





BHS Progress Note (SOAP)


Subjective: 





Tremors, Anxious, Sweating, Fatigue.


Objective: 


PT. A & O X 3, OBSERVED AMBULATING ON UNIT. NO ACUTE DISTRESS.





17 14:19


 Vital Signs











Temperature  97.6 F   17 08:56


 


Pulse Rate  77   17 08:56


 


Respiratory Rate  18   17 08:56


 


Blood Pressure  145/85   17 08:56


 


O2 Sat by Pulse Oximetry (%)      








 Laboratory Tests











  17





  15:39 22:00 05:18


 


WBC   


 


RBC   


 


Hgb   


 


Hct   


 


MCV   


 


MCH   


 


MCHC   


 


RDW   


 


Plt Count   


 


MPV   


 


Sodium   


 


Potassium   


 


Chloride   


 


Carbon Dioxide   


 


Anion Gap   


 


BUN   


 


Creatinine   


 


Creat Clearance w eGFR   


 


POC Glucometer  121   147


 


Random Glucose   


 


Calcium   


 


Total Bilirubin   


 


AST   


 


ALT   


 


Alkaline Phosphatase   


 


Total Protein   


 


Albumin   


 


Urine Color   Yellow 


 


Urine Appearance   Clear 


 


Urine pH   5.0 


 


Ur Specific Gravity   1.025 


 


Urine Protein   Negative 


 


Urine Glucose (UA)   Negative 


 


Urine Ketones   Negative 


 


Urine Blood   Negative 


 


Urine Nitrite   Negative 


 


Urine Bilirubin   Negative 


 


Urine Urobilinogen   Negative 


 


Ur Leukocyte Esterase   Negative 


 


RPR Titer   














  17





  07:00 07:00 07:00


 


WBC  5.8  


 


RBC  4.60  


 


Hgb  12.7  


 


Hct  39.8  


 


MCV  86.6  


 


MCH  27.7  


 


MCHC  32.0  


 


RDW  13.7  


 


Plt Count  240  


 


MPV  8.1  D  


 


Sodium   139 


 


Potassium   4.1 


 


Chloride   106 


 


Carbon Dioxide   27 


 


Anion Gap   6 L 


 


BUN   12  D 


 


Creatinine   0.7  D 


 


Creat Clearance w eGFR   > 60 


 


POC Glucometer   


 


Random Glucose   109 H D 


 


Calcium   8.7 


 


Total Bilirubin   0.5  D 


 


AST   14 L D 


 


ALT   25 


 


Alkaline Phosphatase   118 H 


 


Total Protein   6.6 


 


Albumin   3.1 L D 


 


Urine Color   


 


Urine Appearance   


 


Urine pH   


 


Ur Specific Gravity   


 


Urine Protein   


 


Urine Glucose (UA)   


 


Urine Ketones   


 


Urine Blood   


 


Urine Nitrite   


 


Urine Bilirubin   


 


Urine Urobilinogen   


 


Ur Leukocyte Esterase   


 


RPR Titer    Nonreactive














  17





  16:30 05:39


 


WBC  


 


RBC  


 


Hgb  


 


Hct  


 


MCV  


 


MCH  


 


MCHC  


 


RDW  


 


Plt Count  


 


MPV  


 


Sodium  


 


Potassium  


 


Chloride  


 


Carbon Dioxide  


 


Anion Gap  


 


BUN  


 


Creatinine  


 


Creat Clearance w eGFR  


 


POC Glucometer  163  213


 


Random Glucose  


 


Calcium  


 


Total Bilirubin  


 


AST  


 


ALT  


 


Alkaline Phosphatase  


 


Total Protein  


 


Albumin  


 


Urine Color  


 


Urine Appearance  


 


Urine pH  


 


Ur Specific Gravity  


 


Urine Protein  


 


Urine Glucose (UA)  


 


Urine Ketones  


 


Urine Blood  


 


Urine Nitrite  


 


Urine Bilirubin  


 


Urine Urobilinogen  


 


Ur Leukocyte Esterase  


 


RPR Titer  








LABS NOTED.


Assessment: 





17 14:19


WITHDRAWAL SYMPTOMS.


Plan: 





CONTINUE DETOX.


INCREASE DAILY PO FLUID INTAKE.

## 2017-11-23 NOTE — EKG
Test Reason : 

Blood Pressure : ***/*** mmHG

Vent. Rate : 063 BPM     Atrial Rate : 063 BPM

   P-R Int : 186 ms          QRS Dur : 076 ms

    QT Int : 408 ms       P-R-T Axes : 040 074 067 degrees

   QTc Int : 417 ms

 

NORMAL SINUS RHYTHM

EARLY R WAVE PROGRESSION

WHEN COMPARED WITH ECG OF 21-NOV-2017 19:56,

NO SIGNIFICANT CHANGE WAS FOUND

Confirmed by TRACEY ALMARAZ MD ([id number]) on 11/23/2017 10:26:55 PM

 

Referred By:             Confirmed By:TRACEY ALMARAZ MD

## 2017-11-24 RX ADMIN — LOSARTAN POTASSIUM SCH MG: 25 TABLET, FILM COATED ORAL at 10:27

## 2017-11-24 RX ADMIN — Medication SCH TAB: at 10:27

## 2017-11-24 RX ADMIN — Medication SCH MG: at 22:08

## 2017-11-24 RX ADMIN — CIPROFLOXACIN HYDROCHLORIDE SCH DROP: 3 SOLUTION/ DROPS OPHTHALMIC at 10:27

## 2017-11-24 RX ADMIN — CIPROFLOXACIN HYDROCHLORIDE SCH DROP: 3 SOLUTION/ DROPS OPHTHALMIC at 14:52

## 2017-11-24 RX ADMIN — METFORMIN HYDROCHLORIDE SCH MG: 500 TABLET ORAL at 07:29

## 2017-11-24 RX ADMIN — CIPROFLOXACIN HYDROCHLORIDE SCH DROP: 3 SOLUTION/ DROPS OPHTHALMIC at 05:18

## 2017-11-24 RX ADMIN — HYDROXYZINE PAMOATE PRN MG: 25 CAPSULE ORAL at 22:09

## 2017-11-24 RX ADMIN — CIPROFLOXACIN HYDROCHLORIDE SCH DROP: 3 SOLUTION/ DROPS OPHTHALMIC at 18:27

## 2017-11-24 RX ADMIN — INSULIN DETEMIR SCH UNITS: 100 INJECTION, SOLUTION SUBCUTANEOUS at 07:28

## 2017-11-24 RX ADMIN — METFORMIN HYDROCHLORIDE SCH MG: 500 TABLET ORAL at 16:36

## 2017-11-24 RX ADMIN — HYDROXYZINE PAMOATE PRN MG: 25 CAPSULE ORAL at 10:30

## 2017-11-24 RX ADMIN — CIPROFLOXACIN HYDROCHLORIDE SCH DROP: 3 SOLUTION/ DROPS OPHTHALMIC at 22:09

## 2017-11-24 RX ADMIN — NICOTINE SCH MG: 21 PATCH TRANSDERMAL at 10:27

## 2017-11-24 NOTE — PN
[facility] Progress Note (SOAP)


Subjective: 





Anxious, Sweating.


Objective: 


PT. A & O X 3, OBSERVED AMBULATING ON UNIT. NO ACUTE DISTRESS.





11/24/17 12:14


 Vital Signs











Temperature  97.1 F L  11/24/17 10:00


 


Pulse Rate  82   11/24/17 10:00


 


Respiratory Rate  20   11/24/17 10:00


 


Blood Pressure  139/93   11/24/17 10:00


 


O2 Sat by Pulse Oximetry (%)      








 Laboratory Tests











  11/21/17 11/21/17 11/22/17





  15:39 22:00 05:18


 


WBC   


 


RBC   


 


Hgb   


 


Hct   


 


MCV   


 


MCH   


 


MCHC   


 


RDW   


 


Plt Count   


 


MPV   


 


Sodium   


 


Potassium   


 


Chloride   


 


Carbon Dioxide   


 


Anion Gap   


 


BUN   


 


Creatinine   


 


Creat Clearance w eGFR   


 


POC Glucometer  121   147


 


Random Glucose   


 


Calcium   


 


Total Bilirubin   


 


AST   


 


ALT   


 


Alkaline Phosphatase   


 


Total Protein   


 


Albumin   


 


Urine Color   Yellow 


 


Urine Appearance   Clear 


 


Urine pH   5.0 


 


Ur Specific Gravity   1.025 


 


Urine Protein   Negative 


 


Urine Glucose (UA)   Negative 


 


Urine Ketones   Negative 


 


Urine Blood   Negative 


 


Urine Nitrite   Negative 


 


Urine Bilirubin   Negative 


 


Urine Urobilinogen   Negative 


 


Ur Leukocyte Esterase   Negative 


 


RPR Titer   














  11/22/17 11/22/17 11/22/17





  07:00 07:00 07:00


 


WBC  5.8  


 


RBC  4.60  


 


Hgb  12.7  


 


Hct  39.8  


 


MCV  86.6  


 


MCH  27.7  


 


MCHC  32.0  


 


RDW  13.7  


 


Plt Count  240  


 


MPV  8.1  D  


 


Sodium   139 


 


Potassium   4.1 


 


Chloride   106 


 


Carbon Dioxide   27 


 


Anion Gap   6 L 


 


BUN   12  D 


 


Creatinine   0.7  D 


 


Creat Clearance w eGFR   > 60 


 


POC Glucometer   


 


Random Glucose   109 H D 


 


Calcium   8.7 


 


Total Bilirubin   0.5  D 


 


AST   14 L D 


 


ALT   25 


 


Alkaline Phosphatase   118 H 


 


Total Protein   6.6 


 


Albumin   3.1 L D 


 


Urine Color   


 


Urine Appearance   


 


Urine pH   


 


Ur Specific Gravity   


 


Urine Protein   


 


Urine Glucose (UA)   


 


Urine Ketones   


 


Urine Blood   


 


Urine Nitrite   


 


Urine Bilirubin   


 


Urine Urobilinogen   


 


Ur Leukocyte Esterase   


 


RPR Titer    Nonreactive














  11/22/17 11/23/17 11/23/17





  16:30 05:39 16:20


 


WBC   


 


RBC   


 


Hgb   


 


Hct   


 


MCV   


 


MCH   


 


MCHC   


 


RDW   


 


Plt Count   


 


MPV   


 


Sodium   


 


Potassium   


 


Chloride   


 


Carbon Dioxide   


 


Anion Gap   


 


BUN   


 


Creatinine   


 


Creat Clearance w eGFR   


 


POC Glucometer  163  213  268


 


Random Glucose   


 


Calcium   


 


Total Bilirubin   


 


AST   


 


ALT   


 


Alkaline Phosphatase   


 


Total Protein   


 


Albumin   


 


Urine Color   


 


Urine Appearance   


 


Urine pH   


 


Ur Specific Gravity   


 


Urine Protein   


 


Urine Glucose (UA)   


 


Urine Ketones   


 


Urine Blood   


 


Urine Nitrite   


 


Urine Bilirubin   


 


Urine Urobilinogen   


 


Ur Leukocyte Esterase   


 


RPR Titer   














  11/24/17





  05:17


 


WBC 


 


RBC 


 


Hgb 


 


Hct 


 


MCV 


 


MCH 


 


MCHC 


 


RDW 


 


Plt Count 


 


MPV 


 


Sodium 


 


Potassium 


 


Chloride 


 


Carbon Dioxide 


 


Anion Gap 


 


BUN 


 


Creatinine 


 


Creat Clearance w eGFR 


 


POC Glucometer  269


 


Random Glucose 


 


Calcium 


 


Total Bilirubin 


 


AST 


 


ALT 


 


Alkaline Phosphatase 


 


Total Protein 


 


Albumin 


 


Urine Color 


 


Urine Appearance 


 


Urine pH 


 


Ur Specific Gravity 


 


Urine Protein 


 


Urine Glucose (UA) 


 


Urine Ketones 


 


Urine Blood 


 


Urine Nitrite 


 


Urine Bilirubin 


 


Urine Urobilinogen 


 


Ur Leukocyte Esterase 


 


RPR Titer 








LABS NOTED.


Assessment: 





11/24/17 12:14


WITHDRAWAL SYMPTOMS


Plan: 





CONTINUE DETOX.


INCREASE DAILY PO FLUID INTAKE.

## 2017-11-25 VITALS — SYSTOLIC BLOOD PRESSURE: 147 MMHG | TEMPERATURE: 96.9 F | HEART RATE: 62 BPM | DIASTOLIC BLOOD PRESSURE: 89 MMHG

## 2017-11-25 RX ADMIN — CIPROFLOXACIN HYDROCHLORIDE SCH DROP: 3 SOLUTION/ DROPS OPHTHALMIC at 05:42

## 2017-11-25 RX ADMIN — INSULIN DETEMIR SCH UNITS: 100 INJECTION, SOLUTION SUBCUTANEOUS at 06:33

## 2017-11-25 RX ADMIN — METFORMIN HYDROCHLORIDE SCH MG: 500 TABLET ORAL at 06:32

## 2017-11-25 NOTE — DS
[facility] Detox Discharge Summary


Admission Date: 


11/21/17





Discharge Date: 11/25/17





- History


Present History: Opioid Dependence


Additional Comments: 





PATIENT GOING HOME AT THIS TIME. PATIENT ADVISED TO CONSIDER LOCAL 12-STEP /NA/ 

[facility] OUTPATIENT SUPPORT GROUP MEETINGS FOR AFTERCARE. PATIENT WAS DISCHARGED FROM 

DETOX UNIT IN STABLE MEDICAL CONDITION.


Pertinent Past History: 





Hep C, HTN, Hyperlipidemia, Type II DM, Insomnia, Depression, Nicotine 

Dependence, History of Conjunctivitis of both Eyes.





- Physical Exam Results


Vital Signs: 


 Vital Signs











Temperature  96.9 F L  11/25/17 06:02


 


Pulse Rate  62   11/25/17 06:02


 


Respiratory Rate  18   11/25/17 06:02


 


Blood Pressure  147/89   11/25/17 06:02


 


O2 Sat by Pulse Oximetry (%)      











Pertinent Admission Physical Exam Findings: 





WITHDRAWAL SYMPTOMS.





 Laboratory Tests











  11/21/17 11/21/17 11/22/17





  15:39 22:00 05:18


 


WBC   


 


RBC   


 


Hgb   


 


Hct   


 


MCV   


 


MCH   


 


MCHC   


 


RDW   


 


Plt Count   


 


MPV   


 


Sodium   


 


Potassium   


 


Chloride   


 


Carbon Dioxide   


 


Anion Gap   


 


BUN   


 


Creatinine   


 


Creat Clearance w eGFR   


 


POC Glucometer  121   147


 


Random Glucose   


 


Calcium   


 


Total Bilirubin   


 


AST   


 


ALT   


 


Alkaline Phosphatase   


 


Total Protein   


 


Albumin   


 


Urine Color   Yellow 


 


Urine Appearance   Clear 


 


Urine pH   5.0 


 


Ur Specific Gravity   1.025 


 


Urine Protein   Negative 


 


Urine Glucose (UA)   Negative 


 


Urine Ketones   Negative 


 


Urine Blood   Negative 


 


Urine Nitrite   Negative 


 


Urine Bilirubin   Negative 


 


Urine Urobilinogen   Negative 


 


Ur Leukocyte Esterase   Negative 


 


RPR Titer   














  11/22/17 11/22/17 11/22/17





  07:00 07:00 07:00


 


WBC  5.8  


 


RBC  4.60  


 


Hgb  12.7  


 


Hct  39.8  


 


MCV  86.6  


 


MCH  27.7  


 


MCHC  32.0  


 


RDW  13.7  


 


Plt Count  240  


 


MPV  8.1  D  


 


Sodium   139 


 


Potassium   4.1 


 


Chloride   106 


 


Carbon Dioxide   27 


 


Anion Gap   6 L 


 


BUN   12  D 


 


Creatinine   0.7  D 


 


Creat Clearance w eGFR   > 60 


 


POC Glucometer   


 


Random Glucose   109 H D 


 


Calcium   8.7 


 


Total Bilirubin   0.5  D 


 


AST   14 L D 


 


ALT   25 


 


Alkaline Phosphatase   118 H 


 


Total Protein   6.6 


 


Albumin   3.1 L D 


 


Urine Color   


 


Urine Appearance   


 


Urine pH   


 


Ur Specific Gravity   


 


Urine Protein   


 


Urine Glucose (UA)   


 


Urine Ketones   


 


Urine Blood   


 


Urine Nitrite   


 


Urine Bilirubin   


 


Urine Urobilinogen   


 


Ur Leukocyte Esterase   


 


RPR Titer    Nonreactive














  11/22/17 11/23/17 11/23/17





  16:30 05:39 16:20


 


WBC   


 


RBC   


 


Hgb   


 


Hct   


 


MCV   


 


MCH   


 


MCHC   


 


RDW   


 


Plt Count   


 


MPV   


 


Sodium   


 


Potassium   


 


Chloride   


 


Carbon Dioxide   


 


Anion Gap   


 


BUN   


 


Creatinine   


 


Creat Clearance w eGFR   


 


POC Glucometer  163  213  268


 


Random Glucose   


 


Calcium   


 


Total Bilirubin   


 


AST   


 


ALT   


 


Alkaline Phosphatase   


 


Total Protein   


 


Albumin   


 


Urine Color   


 


Urine Appearance   


 


Urine pH   


 


Ur Specific Gravity   


 


Urine Protein   


 


Urine Glucose (UA)   


 


Urine Ketones   


 


Urine Blood   


 


Urine Nitrite   


 


Urine Bilirubin   


 


Urine Urobilinogen   


 


Ur Leukocyte Esterase   


 


RPR Titer   














  11/24/17 11/24/17 11/24/17





  05:17 16:34 21:41


 


WBC   


 


RBC   


 


Hgb   


 


Hct   


 


MCV   


 


MCH   


 


MCHC   


 


RDW   


 


Plt Count   


 


MPV   


 


Sodium   


 


Potassium   


 


Chloride   


 


Carbon Dioxide   


 


Anion Gap   


 


BUN   


 


Creatinine   


 


Creat Clearance w eGFR   


 


POC Glucometer  269  314  243


 


Random Glucose   


 


Calcium   


 


Total Bilirubin   


 


AST   


 


ALT   


 


Alkaline Phosphatase   


 


Total Protein   


 


Albumin   


 


Urine Color   


 


Urine Appearance   


 


Urine pH   


 


Ur Specific Gravity   


 


Urine Protein   


 


Urine Glucose (UA)   


 


Urine Ketones   


 


Urine Blood   


 


Urine Nitrite   


 


Urine Bilirubin   


 


Urine Urobilinogen   


 


Ur Leukocyte Esterase   


 


RPR Titer   














  11/25/17





  05:40


 


WBC 


 


RBC 


 


Hgb 


 


Hct 


 


MCV 


 


MCH 


 


MCHC 


 


RDW 


 


Plt Count 


 


MPV 


 


Sodium 


 


Potassium 


 


Chloride 


 


Carbon Dioxide 


 


Anion Gap 


 


BUN 


 


Creatinine 


 


Creat Clearance w eGFR 


 


POC Glucometer  168


 


Random Glucose 


 


Calcium 


 


Total Bilirubin 


 


AST 


 


ALT 


 


Alkaline Phosphatase 


 


Total Protein 


 


Albumin 


 


Urine Color 


 


Urine Appearance 


 


Urine pH 


 


Ur Specific Gravity 


 


Urine Protein 


 


Urine Glucose (UA) 


 


Urine Ketones 


 


Urine Blood 


 


Urine Nitrite 


 


Urine Bilirubin 


 


Urine Urobilinogen 


 


Ur Leukocyte Esterase 


 


RPR Titer 








LABS NOTED.





- Treatment


Hospital Course: Detox Protocol Followed, Detoxed Safely, Responded well, 

Discharged Condition Good


Patient has Accepted a Rehab Referral to: PT GOING HOME, ADVISED TO CONSIDER 

LOCAL 12-STEP/NA SUPPORT GROUPS.





- Medication


Discharge Medications: 


Ambulatory Orders





Insulin Glargine,Hum.rec.anlog [Lantus (10mL VIAL) -] 16 units SQ DAILY 11/25/ 16 


Losartan Potassium [Cozaar -] 25 mg PO DAILY 11/25/16 


Metformin HCl [Glucophage -] 500 mg PO BID 11/25/16 


Quetiapine Fumarate [Seroquel -] 200 mg PO HS #30 tab 06/14/17 


Quetiapine Fumarate [Seroquel -] 200 mg PO HS #30 tab 11/22/17 











- Diagnosis


(1) Opioid dependence with withdrawal


Status: Acute   





(2) Conjunctivitis, both eyes


Status: Acute   


Qualifiers: 


   Conjunctivitis type: acute   Acute conjunctivitis type: unspecified   

Qualified Code(s): H10.33 - Unspecified acute conjunctivitis, bilateral   





(3) Insomnia


Status: Acute   


Qualifiers: 


   Insomnia type: unspecified   Qualified Code(s): G47.00 - Insomnia, 

unspecified   





(4) Substance induced mood disorder


Status: Acute   





(5) HLD (hyperlipidemia)


Status: Chronic   


Qualifiers: 


   Hyperlipidemia type: unspecified   Qualified Code(s): E78.5 - Hyperlipidemia

, unspecified   





(6) Hypertension


Status: Chronic   


Qualifiers: 


   Hypertension type: essential hypertension   Qualified Code(s): I10 - 

Essential (primary) hypertension   





(7) Nicotine dependence


Status: Chronic   


Qualifiers: 


   Nicotine product type: cigarettes   Substance use status: uncomplicated   

Qualified Code(s): F17.210 - Nicotine dependence, cigarettes, uncomplicated   





(8) Type 2 diabetes mellitus


Status: Chronic   


Qualifiers: 


   Diabetes mellitus complication status: without complication   Diabetes 

mellitus long term insulin use: with long term use   Qualified Code(s): E11.9 - 

Type 2 diabetes mellitus without complications; Z79.4 - Long term (current) use 

of insulin; Z79.4 - Long term (current) use of insulin; Z79.4 - Long term (

current) use of insulin; Z79.4 - Long term (current) use of insulin   





(9) Depression (emotion)


Status: Suspected   


Qualifiers: 


   Depression Type: dysthymia   Qualified Code(s): F34.1 - Dysthymic disorder   





- AMA


Did Patient Leave Against Medical Advice: No

## 2018-03-20 ENCOUNTER — HOSPITAL ENCOUNTER (INPATIENT)
Dept: HOSPITAL 74 - YASAS | Age: 58
LOS: 4 days | Discharge: HOME | DRG: 897 | End: 2018-03-24
Attending: INTERNAL MEDICINE | Admitting: INTERNAL MEDICINE
Payer: COMMERCIAL

## 2018-03-20 DIAGNOSIS — I10: ICD-10-CM

## 2018-03-20 DIAGNOSIS — F34.1: ICD-10-CM

## 2018-03-20 DIAGNOSIS — Z79.4: ICD-10-CM

## 2018-03-20 DIAGNOSIS — B18.2: ICD-10-CM

## 2018-03-20 DIAGNOSIS — F19.24: ICD-10-CM

## 2018-03-20 DIAGNOSIS — E11.9: ICD-10-CM

## 2018-03-20 DIAGNOSIS — E78.5: ICD-10-CM

## 2018-03-20 DIAGNOSIS — H04.129: ICD-10-CM

## 2018-03-20 DIAGNOSIS — F11.23: Primary | ICD-10-CM

## 2018-03-20 DIAGNOSIS — F17.210: ICD-10-CM

## 2018-03-20 PROCEDURE — HZ2ZZZZ DETOXIFICATION SERVICES FOR SUBSTANCE ABUSE TREATMENT: ICD-10-PCS | Performed by: INTERNAL MEDICINE

## 2018-03-21 LAB
ALBUMIN SERPL-MCNC: 3.4 G/DL (ref 3.4–5)
ALP SERPL-CCNC: 127 U/L (ref 45–117)
ALT SERPL-CCNC: 26 U/L (ref 12–78)
ANION GAP SERPL CALC-SCNC: 10 MMOL/L (ref 8–16)
APPEARANCE UR: CLEAR
AST SERPL-CCNC: 16 U/L (ref 15–37)
BILIRUB SERPL-MCNC: 0.6 MG/DL (ref 0.2–1)
BILIRUB UR STRIP.AUTO-MCNC: NEGATIVE MG/DL
BUN SERPL-MCNC: 13 MG/DL (ref 7–18)
CALCIUM SERPL-MCNC: 8.6 MG/DL (ref 8.5–10.1)
CHLORIDE SERPL-SCNC: 106 MMOL/L (ref 98–107)
CO2 SERPL-SCNC: 25 MMOL/L (ref 21–32)
COLOR UR: (no result)
CREAT SERPL-MCNC: 0.7 MG/DL (ref 0.7–1.3)
DEPRECATED RDW RBC AUTO: 14.7 % (ref 11.9–15.9)
GLUCOSE SERPL-MCNC: 146 MG/DL (ref 74–106)
HCT VFR BLD CALC: 36.4 % (ref 35.4–49)
HGB BLD-MCNC: 11.9 GM/DL (ref 11.7–16.9)
KETONES UR QL STRIP: NEGATIVE
LEUKOCYTE ESTERASE UR QL STRIP.AUTO: NEGATIVE
MCH RBC QN AUTO: 28.5 PG (ref 25.7–33.7)
MCHC RBC AUTO-ENTMCNC: 32.7 G/DL (ref 32–35.9)
MCV RBC: 87.2 FL (ref 80–96)
NITRITE UR QL STRIP: NEGATIVE
PH UR: 6 [PH] (ref 5–8)
PLATELET # BLD AUTO: 229 K/MM3 (ref 134–434)
PMV BLD: 7.9 FL (ref 7.5–11.1)
POTASSIUM SERPLBLD-SCNC: 4 MMOL/L (ref 3.5–5.1)
PROT SERPL-MCNC: 6.8 G/DL (ref 6.4–8.2)
PROT UR QL STRIP: NEGATIVE
PROT UR QL STRIP: NEGATIVE
RBC # BLD AUTO: 4.17 M/MM3 (ref 4–5.6)
SODIUM SERPL-SCNC: 141 MMOL/L (ref 136–145)
SP GR UR: 1.02 (ref 1–1.03)
UROBILINOGEN UR STRIP-MCNC: NEGATIVE MG/DL (ref 0.2–1)
WBC # BLD AUTO: 6.1 K/MM3 (ref 4–10)

## 2018-03-21 RX ADMIN — ATORVASTATIN CALCIUM SCH MG: 40 TABLET, FILM COATED ORAL at 22:43

## 2018-03-21 RX ADMIN — CIPROFLOXACIN HYDROCHLORIDE SCH DROP: 3 SOLUTION/ DROPS OPHTHALMIC at 14:37

## 2018-03-21 RX ADMIN — FLUOCINONIDE SCH APPLIC: 0.5 OINTMENT TOPICAL at 22:43

## 2018-03-21 RX ADMIN — Medication SCH: at 23:11

## 2018-03-21 RX ADMIN — METFORMIN HYDROCHLORIDE SCH MG: 500 TABLET ORAL at 06:20

## 2018-03-21 RX ADMIN — Medication SCH TAB: at 11:06

## 2018-03-21 RX ADMIN — LISINOPRIL SCH MG: 20 TABLET ORAL at 11:09

## 2018-03-21 RX ADMIN — NICOTINE SCH MG: 14 PATCH, EXTENDED RELEASE TRANSDERMAL at 11:09

## 2018-03-21 RX ADMIN — NEOMYCIN AND POLYMYXIN B SULFATES AND DEXAMETHASONE SCH APPLIC: 3.5; 10000; 1 OINTMENT OPHTHALMIC at 14:38

## 2018-03-21 RX ADMIN — CIPROFLOXACIN HYDROCHLORIDE SCH DROP: 3 SOLUTION/ DROPS OPHTHALMIC at 22:42

## 2018-03-21 RX ADMIN — NEOMYCIN AND POLYMYXIN B SULFATES AND DEXAMETHASONE SCH APPLIC: 3.5; 10000; 1 OINTMENT OPHTHALMIC at 22:43

## 2018-03-21 RX ADMIN — METFORMIN HYDROCHLORIDE SCH MG: 500 TABLET ORAL at 16:35

## 2018-03-21 RX ADMIN — Medication SCH MG: at 22:50

## 2018-03-21 NOTE — CONSULT
[facility] Psychiatric Consult





- Data


Date of interview: 03/21/18


Admission source: [facility]


Identifying data: This is 57 years old male, single, unemployed on SSD, living 

alone with no psychiatric hospitalization history, here for detoxification due 

to abusing Heroin and Nicotine


Substance Abuse History: - Smoking Cessation.  Smoking history: Current every 

day smoker.  Have you smoked in the past 12 months: Yes.  Aproximately how many 

cigarettes per day: 10.  Cigars Per Day: 0.  Hx Chewing Tobacco Use: No.  

Initiated information on smoking cessation: Yes.  'Breaking Loose' booklet given

: 03/21/18.  - Substance & Tx. History.  Hx Alcohol Use: No.  Hx Substance Use: 

Yes.  Substance Use Type: Heroin.  Hx Substance Use Treatment: Yes ([facility]).  - 

Substances Abused.  ** HEROIN.  Route: Injection.  Frequency: Daily.  Amount 

used: 3 BAGS.  Age of first use: 42.  Date of Last Use: 03/20/18


Medical History: DM-2, Hyperlipidemia, HepC+, HTN


Psychiatric History: Patient reports anxiety and depression  history,  insomnia

, reports taking prior to admission:  Seroquel 200mg po qhs


Physical/Sexual Abuse/Trauma History: Denies


Additional Comment: Seroquel 200mg po qhs





Mental Status Exam





- Mental Status Exam


Alert and Oriented to: Person


Cognitive Function: Fair


Patient Appearance: Unkempt


Mood: Sad


Affect: Flat


Patient Behavior: Cooperative


Speech Pattern: Appropriate


Voice Loudness: Mildly Soft/Quiet


Thought Process: Circumstantial


Thought Disorder: Being Controlled


Hallucinations: Denies


Suicidal Ideation: Denies


Homicidal Ideation: Denies


Insight/Judgement: Fair


Sleep: Difficulty falling asleep


Appetite: Fair


Muscle strength/Tone: Mild Hypotonicity


Gait/Station: Shuffling


Additional Comments: Seroquel 200mg po qhs





Psychiatric Findings





- Problem List (Axis 1, 2,3)


(1) Nicotine dependence


Current Visit: Yes   Status: Chronic   


Qualifiers: 


   Nicotine product type: cigarettes   Substance use status: uncomplicated   

Qualified Code(s): F17.210 - Nicotine dependence, cigarettes, uncomplicated   





(2) Opioid dependence with withdrawal


Current Visit: Yes   Status: Chronic   





(3) Drug-induced mood disorder


Current Visit: Yes   Status: Acute   





- Initial Treatment Plan


Initial Treatment Plan: Seroquel 200mg po qhs

## 2018-03-21 NOTE — HP
COWS





- Scale


Resting Pulse: 0= [place] 80 or Below


Sweatin= No chills or Flushing


Restless Observation: 3= Extraneous Movement


Pupil Size: 1= Pupils >than Normal


Bone or Joint Aches: 4=Acute Joint/Muscle Pain


Runny Nose/ Eye Tearin= Runny Nose/Eyes


GI Upset > 30mins: 1= Stomach Cramp


Tremor Observation: 1= Tremor Felt, Not Seen


Yawning Observation: 0= None


Anxiety or Irritability: 2=Irritable/Anxious


Goose Flesh Skin: 0=Smooth Skin


COWS Score: 14





Admission ROS [facility]





- hospitals


Chief Complaint: 





"I NEED TO DETOX OFF HEROIN"


Allergies/Adverse Reactions: 


 Allergies











Allergy/AdvReac Type Severity Reaction Status Date / Time


 


No Known Allergies Allergy   Verified 18 00:34











History of Present Illness: 





57 Y.O. MALE WITH LONG HX/O HEROIN DEPENDENCE ADMITTED TO DETOX. CLIENT IS 

KNOWN TO THIS PROGRAM. LAST HERE IN 2017. SELF REFERRED. UTOX + FOR 

METHADONE DENIES MMTP.  TOOK A SIP OF HIS GIRLFRIENDS METHADONE TIS 

MORNING.  HX/O HTN, DM PRESENTLY BEING TREATED FOR CONJUNCTIVITIS. [facility] HAS 

ALREADY HAD 3 DAYS WORTH OF TXMENT AND HX/O ECZEMA. REPORTS LONGEST CLEAN TIME 

5 YEARS


Exam Limitations: No Limitations





- Ebola screening


Have you traveled outside of the country in the last 21 days: No


Have you had contact with anyone from an Ebola affected area: No


Have you been sick,other than usual withdrawal symptoms: No


Do you have a fever: No





- Review of Systems


Constitutional: Malaise, Changes in sleep


EENT: reports: Other (CONJUNCTIVITS)


Respiratory: reports: No Symptoms reported


Cardiac: reports: No Symptoms Reported


GI: reports: Abdominal cramping


: reports: No Symptoms Reported


Musculoskeletal: reports: No Symptoms Reported


Integumentary: reports: Other (ECZEMA)


Neuro: reports: No Symptoms reported


Endocrine: reports: Other (HX/O DM)


Hematology: reports: No Symptoms Reported


Psychiatric: reports: Anxious, Depressed, other (INSOMNIA)


Other Systems: Reviewed and Negative





Patient History





- Patient Medical History


Hx Anemia: No


Hx Asthma: No


Hx Chronic Obstructive Pulmonary Disease (COPD): No


Hx Cancer: No


Hx Cardiac Disorders: No


Hx Congestive Heart Failure: No


Hx Hypertension: Yes


Hx Hypercholesterolemia: Yes


Hx Pacemaker: No


HX Cerebrovascular Accident: No


Hx Seizures: No


Hx Dementia: No


Hx Diabetes: Yes


Hx Gastrointestinal Disorders: No


Hx Liver Disease: No


Hx Genitourinary Disorders: No


Hx Sexually Transmitted Disorders: No


Hx Renal Disease (ESRD): No


Hx Thyroid Disease: No


Hx Human Immunodeficiency Virus (HIV): No


Hx Hepatitis C: Yes (NON DETECTABLE)


Hx Depression: Yes (on med)


Hx Suicide Attempt: No


Hx Bipolar Disorder: No


Hx Schizophrenia: No


Other Medical History: INSOMNIA, ANXIETY





- Patient Surgical History


Past Surgical History: No


Hx Neurologic Surgery: No


Hx Cataract Extraction: No


Hx Cardiac Surgery: No


Hx Lung Surgery: No


Hx Breast Surgery: No


Hx Breast Biopsy: No


Hx Abdominal Surgery: No


Hx Appendectomy: No


Hx Cholecystectomy: No


Hx Genitourinary Surgery: No


Hx  Section: No


Hx Orthopedic Surgery: No


Anesthesia Reaction: No





- PPD History


Previous Implant?: Yes


Documented Results: Negative w/proof


Implanted On Prior [facility] Admission?: Yes


Date: 17


Results: 0MM


PPD to be Administered?: No





- Smoking Cessation


Smoking history: Current every day smoker


Have you smoked in the past 12 months: Yes


Aproximately how many cigarettes per day: 10


Cigars Per Day: 0


Hx Chewing Tobacco Use: No


Initiated information on smoking cessation: Yes


'Breaking Loose' booklet given: 18





- Substance & Tx. History


Hx Alcohol Use: No


Hx Substance Use: Yes


Substance Use Type: Heroin


Hx Substance Use Treatment: Yes ([facility])





- Substances Abused


  ** HEROIN


Route: Injection


Frequency: Daily


Amount used: 3 BAGS


Age of first use: 42


Date of Last Use: 18





Family Disease History





- Family Disease History


Family Disease History: Diabetes: Father ()





Admission Physical Exam [facility] Physical


General Appearance: Yes: Appropriately Dressed, Anxious


HEENTM: Yes: EOMI, Normocephalic, VA, Pharynx Normal, Other (PROTUDING 

CONJUNTIVA)


Respiratory: Yes: Chest Non-Tender, No Respiratory Distress, No Accessory 

Muscle Use, Wheezing


Neck: Yes: No masses,lesions,Nodules, Supple, Trachea in good position


Breast: Yes: Breast Exam Deferred


Cardiology: Yes: Regular Rhythm, Regular Rate, S1, S2


Abdominal: Yes: Normal Bowel Sounds, Non Tender, Flat, Soft


Genitourinary: Yes: Other (NO C/O)


Back: Yes: Normal Inspection


Musculoskeletal: Yes: Other (R HAND CONTRACTURE)


Extremities: Yes: Non-Tender, Tremors (FELT), Other (R HAND CONTRACTURES)


Neurological: Yes: Alert, Other (R HAND PARALYSIS)


Integumentary: Yes: Track Marks, Other (OLD SCARS FROM ABCESS)


Lymphatic: Yes: Within Normal Limits





- Diagnostic


(1) Conjunctivitis, both eyes


Current Visit: Yes   Status: Chronic   


Qualifiers: 


   Conjunctivitis type: acute   Acute conjunctivitis type: bacterial   

Qualified Code(s): H10.33 - Unspecified acute conjunctivitis, bilateral   





(2) Opioid dependence with withdrawal


Current Visit: Yes   Status: Chronic   





(3) HLD (hyperlipidemia)


Current Visit: Yes   Status: Chronic   


Qualifiers: 


   Hyperlipidemia type: unspecified   Qualified Code(s): E78.5 - Hyperlipidemia

, unspecified   





(4) Hypertension


Current Visit: Yes   Status: Chronic   


Qualifiers: 


   Hypertension type: essential hypertension   Qualified Code(s): I10 - 

Essential (primary) hypertension   





(5) Nicotine dependence


Current Visit: Yes   Status: Chronic   


Qualifiers: 


   Nicotine product type: cigarettes   Substance use status: uncomplicated   

Qualified Code(s): F17.210 - Nicotine dependence, cigarettes, uncomplicated   





(6) Type 2 diabetes mellitus


Current Visit: Yes   Status: Chronic   


Qualifiers: 


   Diabetes mellitus long term insulin use: with long term use   Diabetes 

mellitus complication status: without complication   Qualified Code(s): E11.9 - 

Type 2 diabetes mellitus without complications; Z79.4 - Long term (current) use 

of insulin; Z79.4 - Long term (current) use of insulin; Z79.4 - Long term (

current) use of insulin; Z79.4 - Long term (current) use of insulin   





Cleared for Admission [facility]





- Detox or Rehab


[facility] Level of Care: Medically Managed


Detox Regimen/Protocol: Methadone


Claeared for Rehab Admission: No





S Breath Alcohol Content


Breath Alcohol Content: 0

## 2018-03-21 NOTE — PN
BHS COWS





- Scale


Resting Pulse: 0= [place] 80 or Below


Sweatin= Chills/Flushing


Restless Observation: 3= Extraneous Movement


Pupil Size: 1= Pupils >than Normal


Bone or Joint Aches: 2= Severe Diffuse Aches


Runny Nose/ Eye Tearin= Runny Nose/Eyes


GI Upset > 30mins: 2= Nausea/Diarrhea


Tremor Observation of Outstretched Hands: 2= Slight Tremor Visible


Yawning Observation: 1= 1-2x During Session


Anxiety or Irritability: 2=Irritable/Anxious


Goose Flesh Skin: 0=Smooth Skin


COWS Score: 16





BHS Progress Note (SOAP)


Subjective: 





ALERT,IRRITABLE,ANXIOUS,INTERRUPTED SLEEP,TREMOR,PAIN IN THE BODY AND BACK,

TREMOR


Objective: 





18 10:01


 Vital Signs











Temperature  97.5 F L  18 06:00


 


Pulse Rate  57 L  18 06:00


 


Respiratory Rate  18   18 06:00


 


Blood Pressure  139/69   18 06:00


 


O2 Sat by Pulse Oximetry (%)      








EKG SINUS BRADYCARDIA 50/MIN


NO CHEST PAIN,NO SOB,NO DIZZINESS


 Laboratory Last Values











POC Glucometer  157 UNITS ()   18  06:06    








LABS PENDING


Assessment: 





18 10:02


WITHDRAWAL SYMPTOM


Plan: 





CONTINUE DETOX,BGM MONITORING,

## 2018-03-21 NOTE — EKG
Test Reason : 

Blood Pressure : ***/*** mmHG

Vent. Rate : 050 BPM     Atrial Rate : 050 BPM

   P-R Int : 172 ms          QRS Dur : 084 ms

    QT Int : 440 ms       P-R-T Axes : 039 072 067 degrees

   QTc Int : 401 ms

 

SINUS BRADYCARDIA

OTHERWISE NORMAL ECG

WHEN COMPARED WITH ECG OF 22-NOV-2017 16:56,

NO SIGNIFICANT CHANGE WAS FOUND

Confirmed by SOBEIDA BAEZ MD ([id number]) on 3/21/2018 9:58:06 AM

 

Referred By:             Confirmed By:SOBEIDA BAEZ MD

## 2018-03-22 RX ADMIN — METFORMIN HYDROCHLORIDE SCH MG: 500 TABLET ORAL at 08:01

## 2018-03-22 RX ADMIN — LISINOPRIL SCH MG: 20 TABLET ORAL at 10:32

## 2018-03-22 RX ADMIN — NICOTINE SCH MG: 14 PATCH, EXTENDED RELEASE TRANSDERMAL at 10:31

## 2018-03-22 RX ADMIN — NEOMYCIN AND POLYMYXIN B SULFATES AND DEXAMETHASONE SCH APPLIC: 3.5; 10000; 1 OINTMENT OPHTHALMIC at 10:31

## 2018-03-22 RX ADMIN — Medication SCH TAB: at 10:30

## 2018-03-22 RX ADMIN — Medication SCH MG: at 22:36

## 2018-03-22 RX ADMIN — NEOMYCIN AND POLYMYXIN B SULFATES AND DEXAMETHASONE SCH APPLIC: 3.5; 10000; 1 OINTMENT OPHTHALMIC at 22:36

## 2018-03-22 RX ADMIN — CIPROFLOXACIN HYDROCHLORIDE SCH DROP: 3 SOLUTION/ DROPS OPHTHALMIC at 10:31

## 2018-03-22 RX ADMIN — Medication SCH MG: at 22:35

## 2018-03-22 RX ADMIN — FLUOCINONIDE SCH APPLIC: 0.5 OINTMENT TOPICAL at 22:35

## 2018-03-22 RX ADMIN — ATORVASTATIN CALCIUM SCH MG: 40 TABLET, FILM COATED ORAL at 22:36

## 2018-03-22 RX ADMIN — CIPROFLOXACIN HYDROCHLORIDE SCH DROP: 3 SOLUTION/ DROPS OPHTHALMIC at 22:35

## 2018-03-22 RX ADMIN — FLUOCINONIDE SCH APPLIC: 0.5 OINTMENT TOPICAL at 10:30

## 2018-03-22 RX ADMIN — METFORMIN HYDROCHLORIDE SCH MG: 500 TABLET ORAL at 17:30

## 2018-03-22 NOTE — PN
BHS COWS





- Scale


Resting Pulse: 0= [place] 80 or Below


Sweatin= Chills/Flushing


Restless Observation: 3= Extraneous Movement


Pupil Size: 1= Pupils >than Normal


Bone or Joint Aches: 2= Severe Diffuse Aches


Runny Nose/ Eye Tearin= Runny Nose/Eyes


GI Upset > 30mins: 2= Nausea/Diarrhea


Tremor Observation of Outstretched Hands: 2= Slight Tremor Visible


Yawning Observation: 1= 1-2x During Session


Anxiety or Irritability: 2=Irritable/Anxious


Goose Flesh Skin: 0=Smooth Skin


COWS Score: 16





BHS Progress Note (SOAP)


Subjective: 





ALERT,IRRITABLE,ANXIOUS,INTERRUPTED SLEEP,PAIN IN THE BODY


Objective: 





18 11:13


 Vital Signs











Temperature  99.3 F   18 10:00


 


Pulse Rate  61   18 10:00


 


Respiratory Rate  18   18 10:00


 


Blood Pressure  123/70   18 10:00


 


O2 Sat by Pulse Oximetry (%)      








 Laboratory Last Values











WBC  6.1 K/mm3 (4.0-10.0)   18  07:00    


 


RBC  4.17 M/mm3 (4.00-5.60)   18  07:00    


 


Hgb  11.9 GM/dL (11.7-16.9)   18  07:00    


 


Hct  36.4 % (35.4-49)   18  07:00    


 


MCV  87.2 fl (80-96)   18  07:00    


 


MCH  28.5 pg (25.7-33.7)   18  07:00    


 


MCHC  32.7 g/dl (32.0-35.9)   18  07:00    


 


RDW  14.7 % (11.9-15.9)   18  07:00    


 


Plt Count  229 K/MM3 (134-434)   18  07:00    


 


MPV  7.9 fl (7.5-11.1)   18  07:00    


 


Sodium  141 mmol/L (136-145)   18  07:00    


 


Potassium  4.0 mmol/L (3.5-5.1)   18  07:00    


 


Chloride  106 mmol/L ()   18  07:00    


 


Carbon Dioxide  25 mmol/L (21-32)   18  07:00    


 


Anion Gap  10  (8-16)   18  07:00    


 


BUN  13 mg/dL (7-18)   18  07:00    


 


Creatinine  0.7 mg/dL (0.7-1.3)   18  07:00    


 


Creat Clearance w eGFR  > 60  (>60)   18  07:00    


 


POC Glucometer  117 UNITS ()   18  05:39    


 


Random Glucose  146 mg/dL ()  H D 18  07:00    


 


Calcium  8.6 mg/dL (8.5-10.1)   18  07:00    


 


Total Bilirubin  0.6 mg/dL (0.2-1.0)   18  07:00    


 


AST  16 U/L (15-37)   18  07:00    


 


ALT  26 U/L (12-78)   18  07:00    


 


Alkaline Phosphatase  127 U/L ()  H  18  07:00    


 


Total Protein  6.8 g/dl (6.4-8.2)   18  07:00    


 


Albumin  3.4 g/dl (3.4-5.0)   18  07:00    


 


Urine Color  Ltyellow   18  10:20    


 


Urine Appearance  Clear   18  10:20    


 


Urine pH  6.0  (5.0-8.0)   18  10:20    


 


Ur Specific Gravity  1.025  (1.001-1.035)   18  10:20    


 


Urine Protein  Negative  (NEGATIVE)   18  10:20    


 


Urine Glucose (UA)  Negative  (NEGATIVE)   18  10:20    


 


Urine Ketones  Negative  (NEGATIVE)   18  10:20    


 


Urine Blood  Negative  (NEGATIVE)   18  10:20    


 


Urine Nitrite  Negative  (NEGATIVE)   18  10:20    


 


Urine Bilirubin  Negative  (NEGATIVE)   18  10:20    


 


Urine Urobilinogen  Negative mg/dL (0.2-1.0)   18  10:20    


 


Ur Leukocyte Esterase  Negative  (NEGATIVE)   18  10:20    


 


RPR Titer  Nonreactive  (NONREACTIVE)   18  07:00    











Assessment: 





18 11:14


WITHDRAWAL SYMPTOM BUT LESS


18 11:15





Plan: 





CONTINUE DETOX,MEDICATION ADJUST,BGM MONITORING

## 2018-03-23 RX ADMIN — CIPROFLOXACIN HYDROCHLORIDE SCH DROP: 3 SOLUTION/ DROPS OPHTHALMIC at 22:22

## 2018-03-23 RX ADMIN — FLUOCINONIDE SCH APPLIC: 0.5 OINTMENT TOPICAL at 22:25

## 2018-03-23 RX ADMIN — FLUOCINONIDE SCH APPLIC: 0.5 OINTMENT TOPICAL at 10:54

## 2018-03-23 RX ADMIN — Medication SCH MG: at 22:23

## 2018-03-23 RX ADMIN — NEOMYCIN AND POLYMYXIN B SULFATES AND DEXAMETHASONE SCH APPLIC: 3.5; 10000; 1 OINTMENT OPHTHALMIC at 22:24

## 2018-03-23 RX ADMIN — CIPROFLOXACIN HYDROCHLORIDE SCH DROP: 3 SOLUTION/ DROPS OPHTHALMIC at 10:53

## 2018-03-23 RX ADMIN — Medication SCH TAB: at 10:52

## 2018-03-23 RX ADMIN — ATORVASTATIN CALCIUM SCH MG: 40 TABLET, FILM COATED ORAL at 22:24

## 2018-03-23 RX ADMIN — NICOTINE SCH MG: 14 PATCH, EXTENDED RELEASE TRANSDERMAL at 10:53

## 2018-03-23 RX ADMIN — LISINOPRIL SCH MG: 20 TABLET ORAL at 10:53

## 2018-03-23 RX ADMIN — METFORMIN HYDROCHLORIDE SCH MG: 500 TABLET ORAL at 17:35

## 2018-03-23 RX ADMIN — METFORMIN HYDROCHLORIDE SCH MG: 500 TABLET ORAL at 06:21

## 2018-03-23 RX ADMIN — NEOMYCIN AND POLYMYXIN B SULFATES AND DEXAMETHASONE SCH APPLIC: 3.5; 10000; 1 OINTMENT OPHTHALMIC at 10:54

## 2018-03-23 NOTE — PN
BHS Progress Note (SOAP)


Subjective: 





ALERT,IRRITABLE,ANXIOUS,INTERRUPTED SLEEP,PAIN IN BODY


Objective: 





03/23/18 11:39


 Vital Signs











Temperature  98.1 F   03/23/18 09:27


 


Pulse Rate  68   03/23/18 09:27


 


Respiratory Rate  16   03/23/18 09:27


 


Blood Pressure  105/62   03/23/18 09:27


 


O2 Sat by Pulse Oximetry (%)      











Assessment: 





03/23/18 11:40


WITHDRAWAL SYMPTOM


Plan: 





CONTINUE DETOX ,

## 2018-03-24 VITALS — SYSTOLIC BLOOD PRESSURE: 104 MMHG | HEART RATE: 107 BPM | TEMPERATURE: 97.9 F | DIASTOLIC BLOOD PRESSURE: 50 MMHG

## 2018-03-24 RX ADMIN — METFORMIN HYDROCHLORIDE SCH MG: 500 TABLET ORAL at 07:22

## 2018-03-24 NOTE — PN
BHS Progress Note (SOAP)


Subjective: 





Feel fine


no complaint


I have court on monday


I have to help my mother today and do some things tomorrow





Objective: 





03/24/18 12:35


A & O x 3


gait steady


no redness/discharge to eyes


 Vital Signs











Temperature  97.9 F   03/24/18 09:35


 


Pulse Rate  107 H  03/24/18 09:35


 


Respiratory Rate  16   03/24/18 09:35


 


Blood Pressure  104/50   03/24/18 09:35


 


O2 Sat by Pulse Oximetry (%)      














Assessment: 





03/24/18 12:37


detox successful





Plan: 





for d/c today

## 2018-03-24 NOTE — DS
BHS Detox Discharge Summary


Admission Date: 


03/20/18





Discharge Date: 03/24/18





- History


Additional Comments: 





pt being discharged today.


Had discussed early discharge with previous provider and his med regimen doses 

had been tapered down.


Pt states he has court monday but has to help his mother do somethings today 

and tomorrow.


Will attend [facility] rehab program


Declined need for med refill, stating "I have some and am good"


In no macute dsitress, A & O x 3, no adverse signs of detox, denies SI


Pertinent Past History: 





DM 2


HTN


hyperlipidemia


Hep C





- Physical Exam Results


Vital Signs: 


 Vital Signs











Temperature  97.9 F   03/24/18 09:35


 


Pulse Rate  107 H  03/24/18 09:35


 


Respiratory Rate  16   03/24/18 09:35


 


Blood Pressure  104/50   03/24/18 09:35


 


O2 Sat by Pulse Oximetry (%)      











Pertinent Admission Physical Exam Findings: 





withdrawal sx





- Treatment


Hospital Course: Detox Protocol Followed, Detoxed Safely, Responded well, 

Discharged Condition Good


Patient has Accepted a Rehab Referral to: [facility] rehab o/p





- Medication


Discharge Medications: 


Ambulatory Orders





Insulin Glargine,Hum.rec.anlog [Lantus (10mL VIAL) -] 16 units SQ DAILY 11/25/ 16 


metFORMIN HCL [Glucophage -] 500 mg PO BID 11/25/16 


Atorvastatin Ca [Lipitor] 40 mg PO HS 03/21/18 


Ciprofloxacin 0.3% Eye Drops [Ciloxan 0.3% Eye Drops --] 1 drop OU BID 03/21/18 


Lisinopril [Lisinopril] 1 tab PO DAILY 03/21/18 


Quirino/Polymyx B Sulf/Dexameth [Maxitrol -] 1 applic OU BID 03/21/18 


Quetiapine Fumarate [Seroquel -] 200 mg PO HS #30 tab 03/21/18 











- Diagnosis


(1) Opioid dependence with withdrawal


Status: Chronic   





(2) Nicotine dependence


Status: Chronic   


Qualifiers: 


   Nicotine product type: cigarettes   Substance use status: uncomplicated   

Qualified Code(s): F17.210 - Nicotine dependence, cigarettes, uncomplicated   





(3) Dry eye


Status: Chronic   





(4) HLD (hyperlipidemia)


Status: Chronic   


Qualifiers: 


   Hyperlipidemia type: unspecified   Qualified Code(s): E78.5 - Hyperlipidemia

, unspecified   





(5) Hepatitis C


Status: Chronic   


Qualifiers: 


   Viral hepatitis chronicity: carrier   Qualified Code(s): B18.2 - Chronic 

viral hepatitis C   





(6) Hypertension


Status: Chronic   


Qualifiers: 


   Hypertension type: essential hypertension   Qualified Code(s): I10 - 

Essential (primary) hypertension   





(7) Type 2 diabetes mellitus


Status: Chronic   


Qualifiers: 


   Diabetes mellitus long term insulin use: with long term use   Diabetes 

mellitus complication status: without complication   Qualified Code(s): E11.9 - 

Type 2 diabetes mellitus without complications; Z79.4 - Long term (current) use 

of insulin; Z79.4 - Long term (current) use of insulin; Z79.4 - Long term (

current) use of insulin; Z79.4 - Long term (current) use of insulin   





(8) Depression (emotion)


Status: Suspected   


Qualifiers: 


   Depression Type: dysthymia   Qualified Code(s): F34.1 - Dysthymic disorder   





- AMA


Did Patient Leave Against Medical Advice: No

## 2018-06-14 ENCOUNTER — HOSPITAL ENCOUNTER (INPATIENT)
Dept: HOSPITAL 74 - YASAS | Age: 58
LOS: 4 days | Discharge: HOME | DRG: 897 | End: 2018-06-18
Attending: INTERNAL MEDICINE | Admitting: INTERNAL MEDICINE
Payer: COMMERCIAL

## 2018-06-14 VITALS — BODY MASS INDEX: 25.2 KG/M2

## 2018-06-14 DIAGNOSIS — F19.24: ICD-10-CM

## 2018-06-14 DIAGNOSIS — I10: ICD-10-CM

## 2018-06-14 DIAGNOSIS — F41.9: ICD-10-CM

## 2018-06-14 DIAGNOSIS — F31.9: ICD-10-CM

## 2018-06-14 DIAGNOSIS — E78.5: ICD-10-CM

## 2018-06-14 DIAGNOSIS — F17.210: ICD-10-CM

## 2018-06-14 DIAGNOSIS — Z79.4: ICD-10-CM

## 2018-06-14 DIAGNOSIS — B18.2: ICD-10-CM

## 2018-06-14 DIAGNOSIS — Z79.84: ICD-10-CM

## 2018-06-14 DIAGNOSIS — G47.00: ICD-10-CM

## 2018-06-14 DIAGNOSIS — E11.9: ICD-10-CM

## 2018-06-14 DIAGNOSIS — F11.23: Primary | ICD-10-CM

## 2018-06-14 PROCEDURE — HZ2ZZZZ DETOXIFICATION SERVICES FOR SUBSTANCE ABUSE TREATMENT: ICD-10-PCS | Performed by: INTERNAL MEDICINE

## 2018-06-14 RX ADMIN — ATORVASTATIN CALCIUM SCH MG: 40 TABLET, FILM COATED ORAL at 23:25

## 2018-06-14 RX ADMIN — Medication SCH MG: at 23:25

## 2018-06-14 NOTE — HP
COWS





- Scale


Resting Pulse: 0= [place] 80 or Below


Sweatin=Flushed/Facial Moisture


Restless Observation: 0= Sits Still


Pupil Size: 1= Pupils >than Normal


Bone or Joint Aches: 2= Severe Diffuse Aches


Runny Nose/ Eye Tearin= Runny Nose/Eyes


GI Upset > 30mins: 1= Stomach Cramp


Tremor Observation: 2= Slight Tremor Visible


Yawning Observation: 1= 1-2x During Session


Anxiety or Irritability: 2=Irritable/Anxious


Goose Flesh Skin: 0=Smooth Skin


COWS Score: 13





Admission [facility]





- [facility]


Chief Complaint: 





Heroin withdrawal symptoms


Allergies/Adverse Reactions: 


 Allergies











Allergy/AdvReac Type Severity Reaction Status Date / Time


 


No Known Allergies Allergy   Verified 18 19:33











History of Present Illness: 





57 years male with 15 years history of heroin dependence is seeking admission 

to detox. Patient has been in previous detox and reports 5 years of sobriety. 

He has medical history Diabetes type 2, Hypertension, hyperlipidemia, anxiety, 

Hep. C and depression. He denies suicide attempt and suicidal ideation at this 

time.


Exam Limitations: No Limitations





- Ebola screening


Have you traveled outside of the country in the last 21 days: No (N)


Have you had contact with anyone from an Ebola affected area: No


Have you been sick,other than usual withdrawal symptoms: No


Do you have a fever: No





- Review of Systems


Constitutional: Chills, Changes in sleep, Weakness


EENT: reports: Nose Congestion


Respiratory: reports: Shortness of Breath


Cardiac: reports: No Symptoms Reported


GI: reports: Poor Appetite, Poor Fluid Intake, Abdominal cramping


: reports: No Symptoms Reported


Musculoskeletal: reports: Back Pain


Integumentary: reports: Dryness


Neuro: reports: Tremors


Endocrine: reports: No Symptoms Reported


Hematology: reports: No Symptoms Reported


Psychiatric: reports: Anxious, Depressed


Other Systems: Reviewed and Negative





Patient History





- Patient Medical History


Hx Anemia: No


Hx Asthma: No


Hx Chronic Obstructive Pulmonary Disease (COPD): No


Hx Cancer: No


Hx Cardiac Disorders: No


Hx Congestive Heart Failure: No


Hx Hypertension: Yes (lisinopril)


Hx Hypercholesterolemia: Yes (lipitor)


Hx Pacemaker: No


HX Cerebrovascular Accident: No


Hx Seizures: No


Hx Dementia: No


Hx Diabetes: Yes (Metformin)


Hx Gastrointestinal Disorders: No


Hx Liver Disease: Yes (Hep C)


Hx Genitourinary Disorders: No


Hx Sexually Transmitted Disorders: No


Hx Renal Disease (ESRD): No


Hx Thyroid Disease: No


Hx Human Immunodeficiency Virus (HIV): No (Negative 2017)


Hx Hepatitis C: Yes (NON DETECTABLE- Not on medication)


Hx Depression: Yes (on med)


Hx Suicide Attempt: No (Denies suiccide attempt and suicidal ideation at this 

time)


Hx Bipolar Disorder: Yes (Klonopin)


Hx Schizophrenia: No


Other Medical History: Anxiety- Not on medication





- Patient Surgical History


Past Surgical History: No





- PPD History


Previous Implant?: Yes


Documented Results: Negative w/proof


Date: 17


Results: 0MM


PPD to be Administered?: No





- Reproductive History


Patient is a Female of Child Bearing Age (11 -55 yrs old): No (Male)





- Smoking Cessation


Smoking history: Current every day smoker


Have you smoked in the past 12 months: Yes


Aproximately how many cigarettes per day: 10


Cigars Per Day: 0


Hx Chewing Tobacco Use: No


Initiated information on smoking cessation: Yes


'Breaking Loose' booklet given: 18





- Substance & Tx. History


Hx Alcohol Use: No


Hx Substance Use: Yes


Substance Use Type: Heroin


Hx Substance Use Treatment: Yes ([facility])





- Substances Abused


  ** Heroin


Route: Inhalation


Frequency: Daily


Amount used: 4 bags


Age of first use: 41


Date of Last Use: 18





Family Disease History





- Family Disease History


Family Disease History: Diabetes: Father ()





Admission Physical Exam BHS





- Vital Signs


Vital Signs: 


 Vital Signs - 24 hr











  18





  18:38


 


Temperature 97.9 F


 


Pulse Rate 67


 


Respiratory 18





Rate 


 


Blood Pressure 121/72














- Physical


General Appearance: Yes: Moderate Distress, Tremorous, Irritable, Sweating, 

Anxious


HEENTM: Yes: EOMI, Normal ENT Inspection, Normocephalic, Normal Voice, VA


Respiratory: Yes: Lungs Clear, Normal Breath Sounds, No Respiratory Distress


Neck: Yes: Supple


Breast: Yes: Breast Exam Deferred


Cardiology: Yes: Regular Rhythm, Regular Rate, S1, S2


Abdominal: Yes: Normal Bowel Sounds


Genitourinary: Yes: Within Normal Limits


Back: Yes: Normal Inspection


Musculoskeletal: Yes: Back pain


Extremities: Yes: Normal Inspection


Neurological: Yes: Alert, Normal Mood/Affect


Integumentary: Yes: Dry


Lymphatic: Yes: Within Normal Limits





- Diagnostic


(1) Anxiety


Current Visit: Yes   Status: Chronic   





(2) HLD (hyperlipidemia)


Current Visit: Yes   Status: Chronic   


Qualifiers: 


   Hyperlipidemia type: unspecified   Qualified Code(s): E78.5 - Hyperlipidemia

, unspecified   





(3) Hepatitis C


Current Visit: Yes   Status: Chronic   


Qualifiers: 


   Viral hepatitis chronicity: carrier   Qualified Code(s): B18.2 - Chronic 

viral hepatitis C   





(4) Hypertension


Current Visit: Yes   Status: Chronic   


Qualifiers: 


   Hypertension type: essential hypertension   Qualified Code(s): I10 - 

Essential (primary) hypertension   





(5) Nicotine dependence


Current Visit: Yes   Status: Chronic   


Qualifiers: 


   Nicotine product type: cigarettes   Substance use status: uncomplicated   

Qualified Code(s): F17.210 - Nicotine dependence, cigarettes, uncomplicated   





(6) Opioid dependence with withdrawal


Current Visit: Yes   Status: Chronic   





(7) Type 2 diabetes mellitus


Current Visit: Yes   Status: Chronic   


Qualifiers: 


   Diabetes mellitus long term insulin use: with long term use   Diabetes 

mellitus complication status: without complication   Qualified Code(s): E11.9 - 

Type 2 diabetes mellitus without complications; Z79.4 - Long term (current) use 

of insulin   





(8) Depression (emotion)


Current Visit: Yes   Status: Suspected   


Qualifiers: 


   Depression Type: dysthymia   Qualified Code(s): F34.1 - Dysthymic disorder   





Cleared for Admission BHS





- Detox or Rehab


[facility] Level of Care: Medically Managed


Detox Regimen/Protocol: Methadone





[facility] Breath Alcohol Content


Breath Alcohol Content: 0





Urine Drug Screen





- Results


Drug Screen Negative: No


Urine Drug Screen Results: OPI-Opiates

## 2018-06-14 NOTE — PN
BHS Progress Note


Note: 





Patient is a 58 yo male with hx of HTN, DMII, hyperlipidemia. Present to today 

for rehabilitation for THC. C/O of intermittent "mild chest pain" midsternal non

-radiating.  Patient reports he has not taken his BP medications in the last 7 

days, reports he is on HCTZ but does not recall the dose and a second BP 

medication.  Patient last admission to [facility] on 3/21/18 - 3/24/18 for opioid 

detox. Utox today is negative, LION 0.00. Patient denies vertigo, visual changes

, paresthesia, SOB or HA. 





V/S BP (R) 210/125, (L) 221/128, P84, T98.5, RR21





Patient AOx3, no distress 


no JVD, S1, s2


negative neuro symp


+ nail clubbing


no adventitious breath sounds 


ambulating with cane 


non-infected lesion on the right shin 


no edema 





Plan: 


Patient elevated BP sent to Stephanie via [employer] for further evaluation. Writer 

called Stephanie several times to endorse patient without success.

## 2018-06-15 LAB
ALBUMIN SERPL-MCNC: 3.3 G/DL (ref 3.4–5)
ALP SERPL-CCNC: 99 U/L (ref 45–117)
ALT SERPL-CCNC: 28 U/L (ref 12–78)
ANION GAP SERPL CALC-SCNC: 8 MMOL/L (ref 8–16)
APPEARANCE UR: (no result)
AST SERPL-CCNC: 17 U/L (ref 15–37)
BILIRUB SERPL-MCNC: 0.8 MG/DL (ref 0.2–1)
BILIRUB UR STRIP.AUTO-MCNC: NEGATIVE MG/DL
BUN SERPL-MCNC: 12 MG/DL (ref 7–18)
CALCIUM SERPL-MCNC: 8.9 MG/DL (ref 8.5–10.1)
CHLORIDE SERPL-SCNC: 105 MMOL/L (ref 98–107)
CO2 SERPL-SCNC: 26 MMOL/L (ref 21–32)
COLOR UR: YELLOW
CREAT SERPL-MCNC: 0.6 MG/DL (ref 0.7–1.3)
DEPRECATED RDW RBC AUTO: 13.8 % (ref 11.9–15.9)
GLUCOSE SERPL-MCNC: 92 MG/DL (ref 74–106)
HCT VFR BLD CALC: 34.3 % (ref 35.4–49)
HGB BLD-MCNC: 11.3 GM/DL (ref 11.7–16.9)
KETONES UR QL STRIP: NEGATIVE
LEUKOCYTE ESTERASE UR QL STRIP.AUTO: NEGATIVE
MCH RBC QN AUTO: 28.2 PG (ref 25.7–33.7)
MCHC RBC AUTO-ENTMCNC: 32.9 G/DL (ref 32–35.9)
MCV RBC: 85.8 FL (ref 80–96)
NITRITE UR QL STRIP: NEGATIVE
PH UR: 5 [PH] (ref 5–8)
PLATELET # BLD AUTO: 217 K/MM3 (ref 134–434)
PMV BLD: 8.3 FL (ref 7.5–11.1)
POTASSIUM SERPLBLD-SCNC: 4.2 MMOL/L (ref 3.5–5.1)
PROT SERPL-MCNC: 7.2 G/DL (ref 6.4–8.2)
PROT UR QL STRIP: NEGATIVE
PROT UR QL STRIP: NEGATIVE
RBC # BLD AUTO: 4 M/MM3 (ref 4–5.6)
SODIUM SERPL-SCNC: 139 MMOL/L (ref 136–145)
SP GR UR: 1.02 (ref 1–1.03)
UROBILINOGEN UR STRIP-MCNC: NEGATIVE MG/DL (ref 0.2–1)
WBC # BLD AUTO: 4.4 K/MM3 (ref 4–10)

## 2018-06-15 RX ADMIN — Medication SCH TAB: at 10:12

## 2018-06-15 RX ADMIN — LISINOPRIL SCH MG: 20 TABLET ORAL at 10:13

## 2018-06-15 RX ADMIN — METFORMIN HYDROCHLORIDE SCH MG: 500 TABLET ORAL at 06:14

## 2018-06-15 RX ADMIN — ATORVASTATIN CALCIUM SCH MG: 40 TABLET, FILM COATED ORAL at 22:18

## 2018-06-15 RX ADMIN — QUETIAPINE FUMARATE SCH MG: 100 TABLET ORAL at 22:18

## 2018-06-15 RX ADMIN — Medication SCH MG: at 22:16

## 2018-06-15 RX ADMIN — NICOTINE SCH MG: 14 PATCH, EXTENDED RELEASE TRANSDERMAL at 10:12

## 2018-06-15 RX ADMIN — METFORMIN HYDROCHLORIDE SCH MG: 500 TABLET ORAL at 17:03

## 2018-06-15 NOTE — CONSULT
[facility] Psychiatric Consult





- Data


Date of interview: 06/15/18


Admission source: [facility]


Identifying data: Readmission to [facility] for this 58 y/o  male 

seeking detox treatment on [address] for heroin dependence.Patient is single,a 

father of one,domiciled,unemployed and supported on SSD benefits.


Substance Abuse History: Confirmed by patient in this interview.Smoking history

: Current every day smoker.  Have you smoked in the past 12 months: Yes.  

Aproximately how many cigarettes per day: 10.  Cigars Per Day: 0.  Hx Chewing 

Tobacco Use: No.  Initiated information on smoking cessation: Yes.  'Breaking 

Loose' booklet given: 06/14/18.  - Substance & Tx. History.  Hx Alcohol Use: 

No.  Hx Substance Use: Yes.  Substance Use Type: Heroin.  Hx Substance Use 

Treatment: Yes ([facility]).  - Substances Abused.  ** Heroin.  Route: Inhalation.  

Frequency: Daily.  Amount used: 4 bags.  Age of first use: 41.  Date of Last Use

: 06/14/18


Medical History: Medical co-morbidities : hypercholesterolemia,diabetes mellitus

,hepatitis C and hypertension.


Psychiatric History: Patient reports a personal history of multiple psychiatric 

hospitalizations ([facility]).Last 

hospitalized 10 years ago,as per own account.Mr Low reports current OPD 

psychiatric care at [facility] in the [place].Diagnosed with 

Bipolar Disorder.Prescribed klonopin and seroquel 200 mg/hs.Patient denies 

history of suicide attempts.


Physical/Sexual Abuse/Trauma History: No reported history of abuse.


Additional Comment: Urine Drug Screen Results: OPI-Opiates.Noted.





Mental Status Exam





- Mental Status Exam


Alert and Oriented to: Time, Place, Person


Cognitive Function: Good


Patient Appearance: Unkempt, Disheveled


Mood: Withdrawn, Anxious (medication-seeking)


Affect: Mood Congruent


Patient Behavior: Fatigued, Cooperative


Speech Pattern: Clear


Voice Loudness: Normal


Thought Process: Goal Oriented


Thought Disorder: Not Present


Hallucinations: Denies


Suicidal Ideation: Denies


Homicidal Ideation: Denies


Insight/Judgement: Poor


Sleep: Poorly, Difficulty falling asleep


Appetite: Fair


Muscle strength/Tone: Normal


Gait/Station: Normal





Psychiatric Findings





- Problem List (Axis 1, 2,3)


(1) Opioid dependence with withdrawal


Current Visit: Yes   Status: Acute   





(2) Nicotine dependence


Current Visit: Yes   Status: Acute   


Qualifiers: 


   Nicotine product type: cigarettes   Substance use status: uncomplicated   

Qualified Code(s): F17.210 - Nicotine dependence, cigarettes, uncomplicated   





(3) Insomnia


Current Visit: Yes   Status: Acute   


Qualifiers: 


   Insomnia type: unspecified   Qualified Code(s): G47.00 - Insomnia, 

unspecified   





(4) Substance induced mood disorder


Current Visit: Yes   Status: Acute   





(5) Bipolar disorder


Current Visit: Yes   Status: Chronic   Comment: As per self-report.   





- Initial Treatment Plan


Initial Treatment Plan: Psychoeducation.Sleep hygiene.Detoxification in 

progress.Seroquel 100 mg po hs (reduced as caution against oversedation).To be 

titrated to 200 mg/hs if well tolerated.Side effects/benefits discussed with 

the patient.Mr Kelsey agrees to this careplan.Observation.

## 2018-06-15 NOTE — PN
BHS COWS





- Scale


Resting Pulse: 0= [place] 80 or Below


Sweatin= Chills/Flushing


Restless Observation: 1= Difficult to Sit Still


Pupil Size: 0= Normal to Room Light


Bone or Joint Aches: 2= Severe Diffuse Aches


Runny Nose/ Eye Tearin= None


GI Upset > 30mins: 1= Stomach Cramp


Tremor Observation of Outstretched Hands: 2= Slight Tremor Visible


Yawning Observation: 2= >3x During Session


Anxiety or Irritability: 2=Irritable/Anxious


Goose Flesh Skin: 3=Piloerection


COWS Score: 14





BHS Progress Note (SOAP)


Subjective: 





Fatigue, Interrupted Sleep, Stomach Cramping, Tremors.


Objective: 


PATIENT A & O X 3, OBSERVED AMBULATING ON UNIT. NO ACUTE DISTRESS.





06/15/18 14:01


 Vital Signs











Temperature  97.4 F L  06/15/18 09:39


 


Pulse Rate  70   06/15/18 09:39


 


Respiratory Rate  18   06/15/18 09:39


 


Blood Pressure  150/88   06/15/18 09:39


 


O2 Sat by Pulse Oximetry (%)      








 Laboratory Tests











  06/14/18 06/14/18 06/15/18





  23:00 23:24 05:15


 


WBC   


 


RBC   


 


Hgb   


 


Hct   


 


MCV   


 


MCH   


 


MCHC   


 


RDW   


 


Plt Count   


 


MPV   


 


Sodium   


 


Potassium   


 


Chloride   


 


Carbon Dioxide   


 


Anion Gap   


 


BUN   


 


Creatinine   


 


Creat Clearance w eGFR   


 


POC Glucometer   147  140


 


Random Glucose   


 


Calcium   


 


Total Bilirubin   


 


AST   


 


ALT   


 


Alkaline Phosphatase   


 


Total Protein   


 


Albumin   


 


Urine Color  Yellow  


 


Urine Appearance  Turbid  


 


Urine pH  5.0  


 


Ur Specific Gravity  1.025  


 


Urine Protein  Negative  


 


Urine Glucose (UA)  Negative  


 


Urine Ketones  Negative  


 


Urine Blood  Negative  


 


Urine Nitrite  Negative  


 


Urine Bilirubin  Negative  


 


Urine Urobilinogen  Negative  


 


Ur Leukocyte Esterase  Negative  


 


RPR Titer   














  06/15/18 06/15/18 06/15/18





  08:00 08:00 08:00


 


WBC  4.4  


 


RBC  4.00  


 


Hgb  11.3 L  


 


Hct  34.3 L  


 


MCV  85.8  


 


MCH  28.2  


 


MCHC  32.9  


 


RDW  13.8  


 


Plt Count  217  


 


MPV  8.3  


 


Sodium   139 


 


Potassium   4.2 


 


Chloride   105 


 


Carbon Dioxide   26 


 


Anion Gap   8 


 


BUN   12 


 


Creatinine   0.6 L 


 


Creat Clearance w eGFR   > 60 


 


POC Glucometer   


 


Random Glucose   92  D 


 


Calcium   8.9 


 


Total Bilirubin   0.8  D 


 


AST   17 


 


ALT   28 


 


Alkaline Phosphatase   99  D 


 


Total Protein   7.2 


 


Albumin   3.3 L 


 


Urine Color   


 


Urine Appearance   


 


Urine pH   


 


Ur Specific Gravity   


 


Urine Protein   


 


Urine Glucose (UA)   


 


Urine Ketones   


 


Urine Blood   


 


Urine Nitrite   


 


Urine Bilirubin   


 


Urine Urobilinogen   


 


Ur Leukocyte Esterase   


 


RPR Titer    Nonreactive








LABS NOTED.


Assessment: 





06/15/18 14:02


WITHDRAWAL SYMPTOMS.


Plan: 





CONTINUE DETOX.


INCREASE DAILY PO FLUID INTAKE.

## 2018-06-16 RX ADMIN — METFORMIN HYDROCHLORIDE SCH MG: 500 TABLET ORAL at 17:13

## 2018-06-16 RX ADMIN — METFORMIN HYDROCHLORIDE SCH MG: 500 TABLET ORAL at 07:54

## 2018-06-16 RX ADMIN — QUETIAPINE FUMARATE SCH MG: 100 TABLET ORAL at 22:11

## 2018-06-16 RX ADMIN — Medication SCH TAB: at 10:17

## 2018-06-16 RX ADMIN — Medication SCH MG: at 22:11

## 2018-06-16 RX ADMIN — ATORVASTATIN CALCIUM SCH MG: 40 TABLET, FILM COATED ORAL at 22:11

## 2018-06-16 RX ADMIN — LISINOPRIL SCH: 20 TABLET ORAL at 10:21

## 2018-06-16 RX ADMIN — NICOTINE SCH MG: 14 PATCH, EXTENDED RELEASE TRANSDERMAL at 10:17

## 2018-06-16 NOTE — PN
BHS COWS





- Scale


Resting Pulse: 0= [place] 80 or Below


Sweatin= Chills/Flushing


Restless Observation: 0= Sits Still


Pupil Size: 0= Normal to Room Light


Bone or Joint Aches: 2= Severe Diffuse Aches


Runny Nose/ Eye Tearin= Runny Nose/Eyes


GI Upset > 30mins: 0= None


Tremor Observation of Outstretched Hands: 2= Slight Tremor Visible


Yawning Observation: 2= >3x During Session


Anxiety or Irritability: 2=Irritable/Anxious


Goose Flesh Skin: 0=Smooth Skin


COWS Score: 11





BHS Progress Note (SOAP)


Subjective: 





Fatigue, Anxious, Tremors, Body Aches.


Objective: 


PATIENT A & O X 3, OBSERVED AMBULATING ON UNIT. NO ACUTE DISTRESS.





18 16:09


 Vital Signs











Temperature  98.6 F   18 13:25


 


Pulse Rate  71   18 13:25


 


Respiratory Rate  18   18 13:25


 


Blood Pressure  104/66   18 13:25


 


O2 Sat by Pulse Oximetry (%)      








 Laboratory Tests











  06/14/18 06/14/18 06/15/18





  23:00 23:24 05:15


 


WBC   


 


RBC   


 


Hgb   


 


Hct   


 


MCV   


 


MCH   


 


MCHC   


 


RDW   


 


Plt Count   


 


MPV   


 


Sodium   


 


Potassium   


 


Chloride   


 


Carbon Dioxide   


 


Anion Gap   


 


BUN   


 


Creatinine   


 


Creat Clearance w eGFR   


 


POC Glucometer   147  140


 


Random Glucose   


 


Calcium   


 


Total Bilirubin   


 


AST   


 


ALT   


 


Alkaline Phosphatase   


 


Total Protein   


 


Albumin   


 


Urine Color  Yellow  


 


Urine Appearance  Turbid  


 


Urine pH  5.0  


 


Ur Specific Gravity  1.025  


 


Urine Protein  Negative  


 


Urine Glucose (UA)  Negative  


 


Urine Ketones  Negative  


 


Urine Blood  Negative  


 


Urine Nitrite  Negative  


 


Urine Bilirubin  Negative  


 


Urine Urobilinogen  Negative  


 


Ur Leukocyte Esterase  Negative  


 


RPR Titer   














  06/15/18 06/15/18 06/15/18





  08:00 08:00 08:00


 


WBC  4.4  


 


RBC  4.00  


 


Hgb  11.3 L  


 


Hct  34.3 L  


 


MCV  85.8  


 


MCH  28.2  


 


MCHC  32.9  


 


RDW  13.8  


 


Plt Count  217  


 


MPV  8.3  


 


Sodium   139 


 


Potassium   4.2 


 


Chloride   105 


 


Carbon Dioxide   26 


 


Anion Gap   8 


 


BUN   12 


 


Creatinine   0.6 L 


 


Creat Clearance w eGFR   > 60 


 


POC Glucometer   


 


Random Glucose   92  D 


 


Calcium   8.9 


 


Total Bilirubin   0.8  D 


 


AST   17 


 


ALT   28 


 


Alkaline Phosphatase   99  D 


 


Total Protein   7.2 


 


Albumin   3.3 L 


 


Urine Color   


 


Urine Appearance   


 


Urine pH   


 


Ur Specific Gravity   


 


Urine Protein   


 


Urine Glucose (UA)   


 


Urine Ketones   


 


Urine Blood   


 


Urine Nitrite   


 


Urine Bilirubin   


 


Urine Urobilinogen   


 


Ur Leukocyte Esterase   


 


RPR Titer    Nonreactive














  06/15/18 06/16/18





  16:26 05:41


 


WBC  


 


RBC  


 


Hgb  


 


Hct  


 


MCV  


 


MCH  


 


MCHC  


 


RDW  


 


Plt Count  


 


MPV  


 


Sodium  


 


Potassium  


 


Chloride  


 


Carbon Dioxide  


 


Anion Gap  


 


BUN  


 


Creatinine  


 


Creat Clearance w eGFR  


 


POC Glucometer  181  143


 


Random Glucose  


 


Calcium  


 


Total Bilirubin  


 


AST  


 


ALT  


 


Alkaline Phosphatase  


 


Total Protein  


 


Albumin  


 


Urine Color  


 


Urine Appearance  


 


Urine pH  


 


Ur Specific Gravity  


 


Urine Protein  


 


Urine Glucose (UA)  


 


Urine Ketones  


 


Urine Blood  


 


Urine Nitrite  


 


Urine Bilirubin  


 


Urine Urobilinogen  


 


Ur Leukocyte Esterase  


 


RPR Titer  








LABS NOTED.


Assessment: 





18 16:09


WITHDRAWAL SYMPTOMS.


Plan: 





CONTINUE DETOX.

## 2018-06-17 RX ADMIN — NICOTINE SCH MG: 14 PATCH, EXTENDED RELEASE TRANSDERMAL at 10:18

## 2018-06-17 RX ADMIN — LISINOPRIL SCH MG: 20 TABLET ORAL at 10:18

## 2018-06-17 RX ADMIN — METFORMIN HYDROCHLORIDE SCH MG: 500 TABLET ORAL at 17:21

## 2018-06-17 RX ADMIN — Medication SCH MG: at 22:08

## 2018-06-17 RX ADMIN — Medication SCH TAB: at 10:18

## 2018-06-17 RX ADMIN — METFORMIN HYDROCHLORIDE SCH MG: 500 TABLET ORAL at 06:51

## 2018-06-17 RX ADMIN — ATORVASTATIN CALCIUM SCH MG: 40 TABLET, FILM COATED ORAL at 22:08

## 2018-06-17 NOTE — PN
BHS Progress Note


Note: 





Psychiatrist on call note:


Called by nursing staff on behalf of patient reporting difficulty to sleep 

despite getting Seroquel 100 mg po HS. Patient was seen on 6/15/18 by Dr Johnson 

who ordered a reduced dose of Seroquel(100 mg)for patient for fear of sedation. 

As per Dr Johnson dose should be adjusted to his  regular dose of 200 mg if 

reduced dose is well tolerated. According to nursing staff, patient is very 

alert, not sedated and requests that Seroquel dose be inceased to his regular 

dose of 200 mg po HS

## 2018-06-17 NOTE — PN
BHS Progress Note


Note: 





Patient reports he needs to leave tomorrow, he has housing court for [employer] 

and is afraid of loosing his apartment. Patient currently stable. c/o of mild 

difficulty sleeping.  Schedule methadone dose schedule for Monday 10 mg  dose 

adjusted to 5 mg. Patient to be d/c 6/18/18.








 Vital Signs











Temperature  97.5 F L  06/17/18 06:23


 


Pulse Rate  64   06/17/18 06:23


 


Respiratory Rate  18   06/17/18 06:30


 


Blood Pressure  118/65   06/17/18 06:23


 


O2 Sat by Pulse Oximetry (%)      








 Laboratory Last Values











WBC  4.4 K/mm3 (4.0-10.0)   06/15/18  08:00    


 


RBC  4.00 M/mm3 (4.00-5.60)   06/15/18  08:00    


 


Hgb  11.3 GM/dL (11.7-16.9)  L  06/15/18  08:00    


 


Hct  34.3 % (35.4-49)  L  06/15/18  08:00    


 


MCV  85.8 fl (80-96)   06/15/18  08:00    


 


MCH  28.2 pg (25.7-33.7)   06/15/18  08:00    


 


MCHC  32.9 g/dl (32.0-35.9)   06/15/18  08:00    


 


RDW  13.8 % (11.9-15.9)   06/15/18  08:00    


 


Plt Count  217 K/MM3 (134-434)   06/15/18  08:00    


 


MPV  8.3 fl (7.5-11.1)   06/15/18  08:00    


 


Sodium  139 mmol/L (136-145)   06/15/18  08:00    


 


Potassium  4.2 mmol/L (3.5-5.1)   06/15/18  08:00    


 


Chloride  105 mmol/L ()   06/15/18  08:00    


 


Carbon Dioxide  26 mmol/L (21-32)   06/15/18  08:00    


 


Anion Gap  8  (8-16)   06/15/18  08:00    


 


BUN  12 mg/dL (7-18)   06/15/18  08:00    


 


Creatinine  0.6 mg/dL (0.7-1.3)  L  06/15/18  08:00    


 


Creat Clearance w eGFR  > 60  (>60)   06/15/18  08:00    


 


POC Glucometer  258 UNITS ()   06/17/18  06:14    


 


Random Glucose  92 mg/dL ()  D 06/15/18  08:00    


 


Calcium  8.9 mg/dL (8.5-10.1)   06/15/18  08:00    


 


Total Bilirubin  0.8 mg/dL (0.2-1.0)  D 06/15/18  08:00    


 


AST  17 U/L (15-37)   06/15/18  08:00    


 


ALT  28 U/L (12-78)   06/15/18  08:00    


 


Alkaline Phosphatase  99 U/L ()  D 06/15/18  08:00    


 


Total Protein  7.2 g/dl (6.4-8.2)   06/15/18  08:00    


 


Albumin  3.3 g/dl (3.4-5.0)  L  06/15/18  08:00    


 


Urine Color  Yellow   06/14/18  23:00    


 


Urine Appearance  Turbid   06/14/18  23:00    


 


Urine pH  5.0  (5.0-8.0)   06/14/18  23:00    


 


Ur Specific Gravity  1.025  (1.001-1.035)   06/14/18  23:00    


 


Urine Protein  Negative  (NEGATIVE)   06/14/18  23:00    


 


Urine Glucose (UA)  Negative  (NEGATIVE)   06/14/18  23:00    


 


Urine Ketones  Negative  (NEGATIVE)   06/14/18  23:00    


 


Urine Blood  Negative  (NEGATIVE)   06/14/18  23:00    


 


Urine Nitrite  Negative  (NEGATIVE)   06/14/18  23:00    


 


Urine Bilirubin  Negative  (<2.0 mg/dL)   06/14/18  23:00    


 


Urine Urobilinogen  Negative mg/dL (0.2-1.0)   06/14/18  23:00    


 


Ur Leukocyte Esterase  Negative  (NEGATIVE)   06/14/18  23:00    


 


RPR Titer  Nonreactive  (NONREACTIVE)   06/15/18  08:00    








AOx3 in no apparent distress 


no adventitious breath sounds


ambulating in the unit 





Plan: 


continue to monitor 


continue detox

## 2018-06-17 NOTE — PN
BHS Progress Note


Note: 





Pt's FS 308mg/dl at this time.


No symptoms/signs of hyperglycemia.


Pt endorses having been drinking juices.


Pt educated to continue with low sugar diet and drink lots of water.


FSG to be rechecked.

## 2018-06-18 VITALS — DIASTOLIC BLOOD PRESSURE: 82 MMHG | SYSTOLIC BLOOD PRESSURE: 136 MMHG | HEART RATE: 88 BPM | TEMPERATURE: 98.1 F

## 2018-06-18 RX ADMIN — METFORMIN HYDROCHLORIDE SCH MG: 500 TABLET ORAL at 08:11

## 2018-06-18 NOTE — PN
BHS Progress Note (SOAP)


Subjective: 





Denies any complaint


Feels good


Objective: 





06/18/18 09:40


A & o X 3


Gait steady, no acute distress


 Vital Signs











Temperature  98.1 F   06/18/18 08:57


 


Pulse Rate  88   06/18/18 08:57


 


Respiratory Rate  20   06/18/18 08:57


 


Blood Pressure  136/82   06/18/18 08:57


 


O2 Sat by Pulse Oximetry (%)      








 Laboratory Last Values











WBC  4.4 K/mm3 (4.0-10.0)   06/15/18  08:00    


 


RBC  4.00 M/mm3 (4.00-5.60)   06/15/18  08:00    


 


Hgb  11.3 GM/dL (11.7-16.9)  L  06/15/18  08:00    


 


Hct  34.3 % (35.4-49)  L  06/15/18  08:00    


 


MCV  85.8 fl (80-96)   06/15/18  08:00    


 


MCH  28.2 pg (25.7-33.7)   06/15/18  08:00    


 


MCHC  32.9 g/dl (32.0-35.9)   06/15/18  08:00    


 


RDW  13.8 % (11.9-15.9)   06/15/18  08:00    


 


Plt Count  217 K/MM3 (134-434)   06/15/18  08:00    


 


MPV  8.3 fl (7.5-11.1)   06/15/18  08:00    


 


Sodium  139 mmol/L (136-145)   06/15/18  08:00    


 


Potassium  4.2 mmol/L (3.5-5.1)   06/15/18  08:00    


 


Chloride  105 mmol/L ()   06/15/18  08:00    


 


Carbon Dioxide  26 mmol/L (21-32)   06/15/18  08:00    


 


Anion Gap  8  (8-16)   06/15/18  08:00    


 


BUN  12 mg/dL (7-18)   06/15/18  08:00    


 


Creatinine  0.6 mg/dL (0.7-1.3)  L  06/15/18  08:00    


 


Creat Clearance w eGFR  > 60  (>60)   06/15/18  08:00    


 


POC Glucometer  277 UNITS ()   06/18/18  05:25    


 


Random Glucose  92 mg/dL ()  D 06/15/18  08:00    


 


Calcium  8.9 mg/dL (8.5-10.1)   06/15/18  08:00    


 


Total Bilirubin  0.8 mg/dL (0.2-1.0)  D 06/15/18  08:00    


 


AST  17 U/L (15-37)   06/15/18  08:00    


 


ALT  28 U/L (12-78)   06/15/18  08:00    


 


Alkaline Phosphatase  99 U/L ()  D 06/15/18  08:00    


 


Total Protein  7.2 g/dl (6.4-8.2)   06/15/18  08:00    


 


Albumin  3.3 g/dl (3.4-5.0)  L  06/15/18  08:00    


 


Urine Color  Yellow   06/14/18  23:00    


 


Urine Appearance  Turbid   06/14/18  23:00    


 


Urine pH  5.0  (5.0-8.0)   06/14/18  23:00    


 


Ur Specific Gravity  1.025  (1.001-1.035)   06/14/18  23:00    


 


Urine Protein  Negative  (NEGATIVE)   06/14/18  23:00    


 


Urine Glucose (UA)  Negative  (NEGATIVE)   06/14/18  23:00    


 


Urine Ketones  Negative  (NEGATIVE)   06/14/18  23:00    


 


Urine Blood  Negative  (NEGATIVE)   06/14/18  23:00    


 


Urine Nitrite  Negative  (NEGATIVE)   06/14/18  23:00    


 


Urine Bilirubin  Negative  (<2.0 mg/dL)   06/14/18  23:00    


 


Urine Urobilinogen  Negative mg/dL (0.2-1.0)   06/14/18  23:00    


 


Ur Leukocyte Esterase  Negative  (NEGATIVE)   06/14/18  23:00    


 


RPR Titer  Nonreactive  (NONREACTIVE)   06/15/18  08:00    








POC - 277units; random glucose - 92mg/dl


Assessment: 





06/18/18 09:42


detox successfully completed


Plan: 





for d/c

## 2018-06-18 NOTE — DS
BHS Detox Discharge Summary


Admission Date: 


06/14/18





Discharge Date: 06/18/18





- History


Additional Comments: 





pt being discharged home


Will do aftercare as an outpatient  at [facility] f/u with his PMD Dr Galdamez @ [address].


States he has meds at home and does not need refill for the HTN, DM, Chol 


Pertinent Past History: 





HTN


DM


Chol





- Physical Exam Results


Vital Signs: 


 Vital Signs











Temperature  98.1 F   06/18/18 08:57


 


Pulse Rate  88   06/18/18 08:57


 


Respiratory Rate  20   06/18/18 08:57


 


Blood Pressure  136/82   06/18/18 08:57


 


O2 Sat by Pulse Oximetry (%)      











Pertinent Admission Physical Exam Findings: 





withdrawal symptoms





- Medication


Discharge Medications: 


Ambulatory Orders





Insulin Glargine,Hum.rec.anlog [Lantus (10mL VIAL) -] 16 units SQ DAILY 11/25/ 16 


metFORMIN HCL [Glucophage -] 500 mg PO BID 11/25/16 


Atorvastatin Ca [Lipitor] 40 mg PO HS 03/21/18 


Ciprofloxacin 0.3% Eye Drops [Ciloxan 0.3% Eye Drops --] 1 drop OU BID 03/21/18 


Lisinopril 1 tab PO DAILY 03/21/18 


Quetiapine Fumarate [Seroquel -] 200 mg PO HS #30 tab 03/21/18 











- Diagnosis


(1) Opioid dependence with withdrawal


Current Visit: Yes   Status: Acute   





(2) Nicotine dependence


Current Visit: Yes   Status: Acute   


Qualifiers: 


   Nicotine product type: cigarettes   Substance use status: uncomplicated   

Qualified Code(s): F17.210 - Nicotine dependence, cigarettes, uncomplicated   





(3) Substance induced mood disorder


Current Visit: Yes   Status: Acute   





(4) Anxiety


Current Visit: Yes   Status: Chronic   





(5) HLD (hyperlipidemia)


Current Visit: Yes   Status: Chronic   


Qualifiers: 


   Hyperlipidemia type: unspecified   Qualified Code(s): E78.5 - Hyperlipidemia

, unspecified   





(6) Hepatitis C


Current Visit: Yes   Status: Chronic   


Qualifiers: 


   Viral hepatitis chronicity: carrier   Qualified Code(s): B18.2 - Chronic 

viral hepatitis C   





(7) Hypertension


Current Visit: Yes   Status: Chronic   


Qualifiers: 


   Hypertension type: essential hypertension   Qualified Code(s): I10 - 

Essential (primary) hypertension   





(8) Type 2 diabetes mellitus


Current Visit: Yes   Status: Chronic   


Qualifiers: 


   Diabetes mellitus long term insulin use: with long term use   Diabetes 

mellitus complication status: without complication   Qualified Code(s): E11.9 - 

Type 2 diabetes mellitus without complications; Z79.4 - Long term (current) use 

of insulin   





(9) Insomnia


Current Visit: Yes   Status: Acute   


Qualifiers: 


   Insomnia type: unspecified   Qualified Code(s): G47.00 - Insomnia, 

unspecified   





- AMA


Did Patient Leave Against Medical Advice: No

## 2018-10-05 ENCOUNTER — HOSPITAL ENCOUNTER (INPATIENT)
Dept: HOSPITAL 74 - YASAS | Age: 58
LOS: 5 days | Discharge: HOME | DRG: 897 | End: 2018-10-10
Attending: INTERNAL MEDICINE | Admitting: INTERNAL MEDICINE
Payer: COMMERCIAL

## 2018-10-05 VITALS — BODY MASS INDEX: 24.2 KG/M2

## 2018-10-05 DIAGNOSIS — B18.2: ICD-10-CM

## 2018-10-05 DIAGNOSIS — Z79.84: ICD-10-CM

## 2018-10-05 DIAGNOSIS — F19.24: ICD-10-CM

## 2018-10-05 DIAGNOSIS — I10: ICD-10-CM

## 2018-10-05 DIAGNOSIS — F11.23: Primary | ICD-10-CM

## 2018-10-05 DIAGNOSIS — F34.1: ICD-10-CM

## 2018-10-05 DIAGNOSIS — F17.210: ICD-10-CM

## 2018-10-05 DIAGNOSIS — E78.5: ICD-10-CM

## 2018-10-05 DIAGNOSIS — L02.413: ICD-10-CM

## 2018-10-05 DIAGNOSIS — F31.9: ICD-10-CM

## 2018-10-05 DIAGNOSIS — H04.123: ICD-10-CM

## 2018-10-05 DIAGNOSIS — E11.65: ICD-10-CM

## 2018-10-05 DIAGNOSIS — F19.282: ICD-10-CM

## 2018-10-05 PROCEDURE — HZ2ZZZZ DETOXIFICATION SERVICES FOR SUBSTANCE ABUSE TREATMENT: ICD-10-PCS | Performed by: INTERNAL MEDICINE

## 2018-10-05 PROCEDURE — G0008 ADMIN INFLUENZA VIRUS VAC: HCPCS

## 2018-10-05 RX ADMIN — Medication SCH MG: at 22:26

## 2018-10-05 RX ADMIN — METFORMIN HYDROCHLORIDE SCH: 500 TABLET ORAL at 22:26

## 2018-10-05 RX ADMIN — ATORVASTATIN CALCIUM SCH MG: 40 TABLET, FILM COATED ORAL at 22:25

## 2018-10-05 RX ADMIN — CIPROFLOXACIN HYDROCHLORIDE SCH: 3 SOLUTION/ DROPS OPHTHALMIC at 22:25

## 2018-10-05 NOTE — HP
COWS





- Scale


Resting Pulse: 0= [place] 80 or Below


Sweatin=Flushed/Facial Moisture


Restless Observation: 1= Difficult to Sit Still


Pupil Size: 0= Normal to Room Light


Bone or Joint Aches: 0= None


Runny Nose/ Eye Tearin= Runny Nose/Eyes


GI Upset > 30mins: 0= None


Tremor Observation: 0= None


Yawning Observation: 1= 1-2x During Session


Anxiety or Irritability: 1=Feels Anxious/Irritable


Goose Flesh Skin: 0=Smooth Skin


COWS Score: 7





Admission ROS S





- HPI


Chief Complaint: 





SEEKING DETOX FOR WORSENING WITHDRAWAL SX'S FROM HEROIN


Allergies/Adverse Reactions: 


 Allergies











Allergy/AdvReac Type Severity Reaction Status Date / Time


 


No Known Allergies Allergy   Verified 10/05/18 17:12











History of Present Illness: 





58 Y.O. MALE WITH LONG HX/O OPIOID DEPENDENCE HERE FOR DETOX. CLIENT IS KNOWN 

TO THIS PROGRAM. LAST HERE 2018. DENIES ANY INPATIENT TXMENT SINCE. HE 

PRESENTS WITH C/O WORSENING WITHDRAWAL SX'S PRESENTLY COWS 7. HE HAS HX/O DM, 

HTN, HLD,  AND BIPOLAR, DEPRESSION, INSOMNIA. REPORTS LONGEST CLEAN TIME 5 

YEARS AGO SELF SUSTAINED. DNEIS PAST/PRESENT SI/HI/ATTEMPTS, AVH, DRUG OVERDOSE

, SEIZURES. DENIES LEGALS


Exam Limitations: No Limitations





- Ebola screening


Have you traveled outside of the country in the last 21 days: No


Have you had contact with anyone from an Ebola affected area: No


Have you been sick,other than usual withdrawal symptoms: No


Do you have a fever: No





- Review of Systems


Constitutional: No Symptoms Reported, Chills, Changes in sleep


EENT: reports: Other (WATERY EYES)


Respiratory: reports: Cough (X 2 WEEKS)


Cardiac: reports: No Symptoms Reported


GI: reports: No Symptoms Reported


: reports: No Symptoms Reported


Musculoskeletal: reports: No Symptoms Reported


Integumentary: reports: Other (RESOLVING ABCESS TO RIGHT FOREARM)


Neuro: reports: No Symptoms reported


Endocrine: reports: Other (HX/O DM)


Hematology: reports: No Symptoms Reported


Psychiatric: reports: other (INSMONIA)


Other Systems: Reviewed and Negative





Patient History





- Patient Medical History


Hx Anemia: No


Hx Asthma: No


Hx Chronic Obstructive Pulmonary Disease (COPD): No


Hx Cancer: No


Hx Cardiac Disorders: No


Hx Congestive Heart Failure: No


Hx Hypertension: Yes (lisinopril)


Hx Hypercholesterolemia: Yes (lipitor)


Hx Pacemaker: No


HX Cerebrovascular Accident: No


Hx Seizures: No


Hx Dementia: No


Hx Diabetes: Yes (Metformin)


Hx Gastrointestinal Disorders: No


Hx Liver Disease: Yes (Hep C)


Hx Genitourinary Disorders: No


Hx Sexually Transmitted Disorders: No


Hx Renal Disease (ESRD): No


Hx Thyroid Disease: No


Hx Human Immunodeficiency Virus (HIV): No


Hx Hepatitis C: Yes


Hx Depression: Yes


Hx Suicide Attempt: No


Hx Bipolar Disorder: Yes


Hx Schizophrenia: No


Other Medical History: DENIES





- Patient Surgical History


Past Surgical History: No


Hx Neurologic Surgery: No


Hx Cataract Extraction: No


Hx Cardiac Surgery: No


Hx Lung Surgery: No


Hx Breast Surgery: No


Hx Breast Biopsy: No


Hx Abdominal Surgery: No


Hx Appendectomy: No


Hx Cholecystectomy: No


Hx Genitourinary Surgery: No


Hx  Section: No


Hx Orthopedic Surgery: No


Anesthesia Reaction: No





- PPD History


Previous Implant?: Yes


Documented Results: Negative w/proof


Implanted On Prior [facility] Admission?: Yes


Date: 17


Results: NEGATIVE


PPD to be Administered?: No





- Smoking Cessation


Smoking history: Current every day smoker


Have you smoked in the past 12 months: Yes


Aproximately how many cigarettes per day: 8


Cigars Per Day: 0


Hx Chewing Tobacco Use: No


Initiated information on smoking cessation: Yes


'Breaking Loose' booklet given: 10/05/18





- Substance & Tx. History


Hx Alcohol Use: No


Hx Substance Use: Yes


Substance Use Type: Heroin


Hx Substance Use Treatment: Yes ([facility])





- Substances Abused


  ** Heroin


Route: Injection


Frequency: Daily


Amount used: 5 BAGS DAILY


Age of first use: 42


Date of Last Use: 10/04/18





Family Disease History





- Family Disease History


Family Disease History: Diabetes: Father ()





Admission Physical Exam [facility]





- Vital Signs


Vital Signs: 


 Vital Signs - 24 hr











  10/05/18





  12:51


 


Temperature 97.4 F L


 


Pulse Rate 73


 


Respiratory 18





Rate 


 


Blood Pressure 123/79














- Physical


General Appearance: Yes: Appropriately Dressed, Other (WATERY EYES, PERIORBITAL 

HYPOPIMENTATION AN THICKENING OF THE SKIN)


HEENTM: Yes: EOMI, Normocephalic, Normal Voice, VA, Pharynx Normal, Nasal 

Congestion, Other (MISSING TEETH POOR DENTITION)


Respiratory: Yes: Lungs Clear, Normal Breath Sounds, No Respiratory Distress, 

No Accessory Muscle Use, Other (COUGH)


Neck: Yes: No masses,lesions,Nodules, Supple, Trachea in good position


Breast: Yes: Breast Exam Deferred


Cardiology: Yes: Regular Rhythm, Regular Rate, S1, S2


Abdominal: Yes: Normal Bowel Sounds, Non Tender, Soft, Protuberent


Genitourinary: Yes: Other (DENIES ANY C/O)


Back: Yes: Normal Inspection


Musculoskeletal: Yes: full range of Motion, Gait Steady


Extremities: Yes: Normal Capillary Refill, Normal Range of Motion, Non-Tender


Neurological: Yes: Fully Oriented, Alert, Motor Strength 5/5, Normal Mood/Affect


Integumentary: Yes: Cold, Track Marks, Other (RESOLVING ABCESS NOTED TO R 

FOREARM)


Lymphatic: Yes: Within Normal Limits





- Diagnostic


(1) Abscess of right forearm


Current Visit: Yes   Status: Chronic   Comment: RESOLVING PRESENT OVER 2 WEEKS 

  





(2) Drug-induced mood disorder


Current Visit: Yes   Status: Chronic   





(3) Insomnia


Current Visit: Yes   Status: Chronic   


Qualifiers: 


   Insomnia type: drug-induced   Qualified Code(s): F19.982 - Other 

psychoactive substance use, unspecified with psychoactive substance-induced 

sleep disorder   





(4) Nicotine dependence


Current Visit: Yes   Status: Chronic   


Qualifiers: 


   Nicotine product type: cigarettes   Substance use status: uncomplicated   

Qualified Code(s): F17.210 - Nicotine dependence, cigarettes, uncomplicated   





(5) Opioid dependence with withdrawal


Current Visit: Yes   Status: Acute   





(6) Bipolar disorder


Current Visit: Yes   Status: Chronic   Comment: As per self-report.   





(7) Dry eye


Current Visit: Yes   Status: Chronic   





(8) HLD (hyperlipidemia)


Current Visit: Yes   Status: Chronic   


Qualifiers: 


   Hyperlipidemia type: unspecified   Qualified Code(s): E78.5 - Hyperlipidemia

, unspecified   





(9) Hypertension


Current Visit: Yes   Status: Chronic   


Qualifiers: 


   Hypertension type: essential hypertension   Qualified Code(s): I10 - 

Essential (primary) hypertension   





(10) Type 2 diabetes mellitus


Current Visit: Yes   Status: Chronic   


Qualifiers: 


   Diabetes mellitus long term insulin use: with long term use   Diabetes 

mellitus complication status: without complication   Qualified Code(s): E11.9 - 

Type 2 diabetes mellitus without complications; Z79.4 - Long term (current) use 

of insulin   





(11) Depression (emotion)


Current Visit: Yes   Status: Suspected   


Qualifiers: 


   Depression Type: dysthymia   Qualified Code(s): F34.1 - Dysthymic disorder   





Cleared for Admission [facility]





- Detox or Rehab


[facility] Level of Care: Medically Managed


Detox Regimen/Protocol: Methadone


Claeared for Rehab Admission: No





[facility] Breath Alcohol Content


Breath Alcohol Content: 0





Urine Drug Screen





- Results


Drug Screen Negative: No


Urine Drug Screen Results: OPI-Opiates, BAR-Barbiturates

## 2018-10-06 LAB
ALBUMIN SERPL-MCNC: 3.2 G/DL (ref 3.4–5)
ALP SERPL-CCNC: 102 U/L (ref 45–117)
ALT SERPL-CCNC: 18 U/L (ref 13–61)
ANION GAP SERPL CALC-SCNC: 7 MMOL/L (ref 8–16)
APPEARANCE UR: (no result)
AST SERPL-CCNC: 15 U/L (ref 15–37)
BILIRUB SERPL-MCNC: 0.3 MG/DL (ref 0.2–1)
BILIRUB UR STRIP.AUTO-MCNC: NEGATIVE MG/DL
BUN SERPL-MCNC: 11 MG/DL (ref 7–18)
CALCIUM SERPL-MCNC: 8.7 MG/DL (ref 8.5–10.1)
CHLORIDE SERPL-SCNC: 105 MMOL/L (ref 98–107)
CO2 SERPL-SCNC: 26 MMOL/L (ref 21–32)
COLOR UR: (no result)
CREAT SERPL-MCNC: 0.6 MG/DL (ref 0.55–1.3)
DEPRECATED RDW RBC AUTO: 14 % (ref 11.9–15.9)
GLUCOSE SERPL-MCNC: 99 MG/DL (ref 74–106)
HCT VFR BLD CALC: 36.4 % (ref 35.4–49)
HGB BLD-MCNC: 11.6 GM/DL (ref 11.7–16.9)
KETONES UR QL STRIP: NEGATIVE
LEUKOCYTE ESTERASE UR QL STRIP.AUTO: NEGATIVE
MCH RBC QN AUTO: 27.7 PG (ref 25.7–33.7)
MCHC RBC AUTO-ENTMCNC: 31.9 G/DL (ref 32–35.9)
MCV RBC: 86.9 FL (ref 80–96)
NITRITE UR QL STRIP: NEGATIVE
PH UR: 5 [PH] (ref 5–8)
PLATELET # BLD AUTO: 255 K/MM3 (ref 134–434)
PMV BLD: 7.5 FL (ref 7.5–11.1)
POTASSIUM SERPLBLD-SCNC: 4.4 MMOL/L (ref 3.5–5.1)
PROT SERPL-MCNC: 7.1 G/DL (ref 6.4–8.2)
PROT UR QL STRIP: NEGATIVE
PROT UR QL STRIP: NEGATIVE
RBC # BLD AUTO: 4.19 M/MM3 (ref 4–5.6)
SODIUM SERPL-SCNC: 137 MMOL/L (ref 136–145)
SP GR UR: 1.03 (ref 1.01–1.03)
UROBILINOGEN UR STRIP-MCNC: NEGATIVE MG/DL (ref 0.2–1)
WBC # BLD AUTO: 6.1 K/MM3 (ref 4–10)

## 2018-10-06 RX ADMIN — METFORMIN HYDROCHLORIDE SCH MG: 500 TABLET ORAL at 06:39

## 2018-10-06 RX ADMIN — ZOLPIDEM TARTRATE PRN MG: 10 TABLET, FILM COATED ORAL at 23:01

## 2018-10-06 RX ADMIN — NICOTINE SCH: 14 PATCH, EXTENDED RELEASE TRANSDERMAL at 10:52

## 2018-10-06 RX ADMIN — Medication SCH TAB: at 10:36

## 2018-10-06 RX ADMIN — LISINOPRIL SCH MG: 20 TABLET ORAL at 10:37

## 2018-10-06 RX ADMIN — ATORVASTATIN CALCIUM SCH MG: 40 TABLET, FILM COATED ORAL at 22:59

## 2018-10-06 RX ADMIN — CIPROFLOXACIN HYDROCHLORIDE SCH DROP: 3 SOLUTION/ DROPS OPHTHALMIC at 10:35

## 2018-10-06 RX ADMIN — METFORMIN HYDROCHLORIDE SCH MG: 500 TABLET ORAL at 17:13

## 2018-10-06 RX ADMIN — Medication SCH MG: at 22:59

## 2018-10-06 RX ADMIN — CIPROFLOXACIN HYDROCHLORIDE SCH DROP: 3 SOLUTION/ DROPS OPHTHALMIC at 22:14

## 2018-10-06 NOTE — EKG
Test Reason : 

Blood Pressure : ***/*** mmHG

Vent. Rate : 061 BPM     Atrial Rate : 061 BPM

   P-R Int : 162 ms          QRS Dur : 072 ms

    QT Int : 380 ms       P-R-T Axes : 063 080 072 degrees

   QTc Int : 382 ms

 

NORMAL SINUS RHYTHM

NORMAL ECG

WHEN COMPARED WITH ECG OF 14-JUN-2018 23:11,

NO SIGNIFICANT CHANGE WAS FOUND

BASELINE ARTIFACT

Confirmed by LADY WU, SONYA ([id number]) on 10/6/2018 5:27:30 PM

 

Referred By:             Confirmed By:SONYA NARAYANAN MD

## 2018-10-06 NOTE — PN
BHS COWS





- Scale


Resting Pulse: 0= [place] 80 or Below


Sweatin= Chills/Flushing


Restless Observation: 1= Difficult to Sit Still


Pupil Size: 0= Normal to Room Light


Bone or Joint Aches: 1= Mild Discomfort


Runny Nose/ Eye Tearin= Runny Nose/Eyes


GI Upset > 30mins: 0= None


Tremor Observation of Outstretched Hands: 2= Slight Tremor Visible


Yawning Observation: 2= >3x During Session


Anxiety or Irritability: 2=Irritable/Anxious


Goose Flesh Skin: 0=Smooth Skin


COWS Score: 11





BHS Progress Note (SOAP)


Subjective: 





irritable, anxious, sweats 


Objective: 





10/06/18 15:49


 Vital Signs











Temperature  96.7 F L  10/06/18 13:24


 


Pulse Rate  66   10/06/18 13:24


 


Respiratory Rate  16   10/06/18 13:24


 


Blood Pressure  104/59 L  10/06/18 13:24


 


O2 Sat by Pulse Oximetry (%)      








 Laboratory Last Values











WBC  6.1 K/mm3 (4.0-10.0)   10/06/18  08:00    


 


RBC  4.19 M/mm3 (4.00-5.60)   10/06/18  08:00    


 


Hgb  11.6 GM/dL (11.7-16.9)  L  10/06/18  08:00    


 


Hct  36.4 % (35.4-49)   10/06/18  08:00    


 


MCV  86.9 fl (80-96)   10/06/18  08:00    


 


MCH  27.7 pg (25.7-33.7)   10/06/18  08:00    


 


MCHC  31.9 g/dl (32.0-35.9)  L  10/06/18  08:00    


 


RDW  14.0 % (11.9-15.9)   10/06/18  08:00    


 


Plt Count  255 K/MM3 (134-434)   10/06/18  08:00    


 


MPV  7.5 fl (7.5-11.1)   10/06/18  08:00    


 


Sodium  137 mmol/L (136-145)   10/06/18  08:00    


 


Potassium  4.4 mmol/L (3.5-5.1)   10/06/18  08:00    


 


Chloride  105 mmol/L ()   10/06/18  08:00    


 


Carbon Dioxide  26 mmol/L (21-32)   10/06/18  08:00    


 


Anion Gap  7 MMOL/L (8-16)  L  10/06/18  08:00    


 


BUN  11 mg/dL (7-18)   10/06/18  08:00    


 


Creatinine  0.6 mg/dL (0.55-1.3)   10/06/18  08:00    


 


Creat Clearance w eGFR  > 60  (>60)   10/06/18  08:00    


 


POC Glucometer  110 UNITS ()   10/06/18  05:32    


 


Random Glucose  99 mg/dL ()   10/06/18  08:00    


 


Calcium  8.7 mg/dL (8.5-10.1)   10/06/18  08:00    


 


Total Bilirubin  0.3 mg/dL (0.2-1)   10/06/18  08:00    


 


AST  15 U/L (15-37)   10/06/18  08:00    


 


ALT  18 U/L (13-61)   10/06/18  08:00    


 


Alkaline Phosphatase  102 U/L ()   10/06/18  08:00    


 


Total Protein  7.1 g/dl (6.4-8.2)   10/06/18  08:00    


 


Albumin  3.2 g/dl (3.4-5.0)  L  10/06/18  08:00    


 


Urine Color  Fiordaliza   10/05/18  10:00    


 


Urine Appearance  Turbid   10/05/18  10:00    


 


Urine pH  5.0  (5.0-8.0)   10/05/18  10:00    


 


Ur Specific Gravity  1.028  (1.010-1.035)   10/05/18  10:00    


 


Urine Protein  Negative  (NEGATIVE)   10/05/18  10:00    


 


Urine Glucose (UA)  Negative  (NEGATIVE)   10/05/18  10:00    


 


Urine Ketones  Negative  (NEGATIVE)   10/05/18  10:00    


 


Urine Blood  Negative  (NEGATIVE)   10/05/18  10:00    


 


Urine Nitrite  Negative  (NEGATIVE)   10/05/18  10:00    


 


Urine Bilirubin  Negative  (<2.0 mg/dL)   10/05/18  10:00    


 


Urine Urobilinogen  Negative mg/dL (0.2-1.0)   10/05/18  10:00    


 


Ur Leukocyte Esterase  Negative  (NEGATIVE)   10/05/18  10:00    


 


RPR Titer  Nonreactive  (NONREACTIVE)   10/06/18  08:00    








labs reviewed 


Assessment: 





10/06/18 15:50


withdrawal sx 


Plan: 





increase fluids 


continue detox 


continue to monitor

## 2018-10-06 NOTE — CONSULT
[facility] Psychiatric Consult





- Data


Date of interview: 10/06/18


Admission source: [facility]


Identifying data: One of several admissions to [facility] for this 59 y/o 

 male seeking detox treatment on [address] for heroin dependence.Patient 

is single,a father of one,domiciled,unemployed and supported on SSI benefits.


Substance Abuse History: Confirmed by the patient in this interview.Details in 

current BHS report : Smoking history: Current every day smoker.  Have you 

smoked in the past 12 months: Yes.  Aproximately how many cigarettes per day: 

8.  Cigars Per Day: 0.  Hx Chewing Tobacco Use: No.  Initiated information on 

smoking cessation: Yes.  'Breaking Loose' booklet given: 10/05/18.  - Substance 

& Tx. History.  Hx Alcohol Use: No.  Hx Substance Use: Yes.  Substance Use Type

: Heroin.  Hx Substance Use Treatment: Yes ([facility]).  - Substances Abused.  ** 

Heroin.  Route: Injection.  Frequency: Daily.  Amount used: 5 BAGS DAILY.  Age 

of first use: 42.  Date of Last Use: 10/04/18


Medical History: hypercholesterolemia,diabetes mellitus,hepatitis C and 

hypertension.


Psychiatric History: Personal history of multiple psychiatric hospitalizations (

[facility]).Mr Kelsey is still followed 

for OPD psychiatric care at [facility] in the [place].Diagnosed 

with Bipolar Disorder.Prescribed zolpidem 10 mg/hs + seroquel 200 mg/hs.Patient 

denies history of suicide attempts.


Physical/Sexual Abuse/Trauma History: Patient denies.


Additional Comment: Urine Drug Screen Results: OPI-Opiates, BAR-

Barbiturates.Noted.





Mental Status Exam





- Mental Status Exam


Alert and Oriented to: Time, Place, Person


Cognitive Function: Good


Patient Appearance: Well Groomed


Mood: Anxious, Hopeful


Affect: Mood Congruent


Patient Behavior: Appropriate, Cooperative


Speech Pattern: Clear


Voice Loudness: Normal


Thought Process: Intact, Goal Oriented


Thought Disorder: Not Present


Hallucinations: Denies


Suicidal Ideation: Denies


Homicidal Ideation: Denies


Insight/Judgement: Poor


Sleep: Poorly, Difficulty falling asleep


Appetite: Good


Muscle strength/Tone: Normal


Gait/Station: Normal





Psychiatric Findings





- Problem List (Axis 1, 2,3)


(1) Opioid dependence with withdrawal


Current Visit: Yes   Status: Acute   





(2) Nicotine dependence


Current Visit: Yes   Status: Acute   





(3) Substance induced mood disorder


Current Visit: Yes   Status: Acute   





(4) Bipolar disorder


Current Visit: Yes   Status: Chronic   Comment: As per self-report.   





(5) Insomnia


Current Visit: Yes   Status: Acute   


Qualifiers: 


   Insomnia type: drug-induced   Qualified Code(s): F19.982 - Other 

psychoactive substance use, unspecified with psychoactive substance-induced 

sleep disorder   





- Initial Treatment Plan


Initial Treatment Plan: Psychoeducation.Sleep 

hygiene.Detoxification.Medications : seroquel 200 mg po hs + ambien 10 mg po hs 

prn.Side effects/benefits of both medications are discussed with the 

patient.Agrees with careplan.Observation.

## 2018-10-07 RX ADMIN — LISINOPRIL SCH: 20 TABLET ORAL at 10:46

## 2018-10-07 RX ADMIN — Medication SCH MG: at 22:40

## 2018-10-07 RX ADMIN — METFORMIN HYDROCHLORIDE SCH MG: 500 TABLET ORAL at 17:30

## 2018-10-07 RX ADMIN — NICOTINE SCH: 14 PATCH, EXTENDED RELEASE TRANSDERMAL at 10:45

## 2018-10-07 RX ADMIN — METFORMIN HYDROCHLORIDE SCH MG: 500 TABLET ORAL at 07:59

## 2018-10-07 RX ADMIN — ZOLPIDEM TARTRATE PRN MG: 10 TABLET, FILM COATED ORAL at 22:43

## 2018-10-07 RX ADMIN — ATORVASTATIN CALCIUM SCH MG: 40 TABLET, FILM COATED ORAL at 22:41

## 2018-10-07 RX ADMIN — Medication SCH TAB: at 10:41

## 2018-10-07 RX ADMIN — CIPROFLOXACIN HYDROCHLORIDE SCH DROP: 3 SOLUTION/ DROPS OPHTHALMIC at 10:41

## 2018-10-07 RX ADMIN — CIPROFLOXACIN HYDROCHLORIDE SCH DROP: 3 SOLUTION/ DROPS OPHTHALMIC at 22:41

## 2018-10-07 NOTE — PN
BHS COWS





- Scale


Resting Pulse: 0= NE 80 or Below


Sweatin= Chills/Flushing


Restless Observation: 1= Difficult to Sit Still


Pupil Size: 0= Normal to Room Light


Bone or Joint Aches: 2= Severe Diffuse Aches


Runny Nose/ Eye Tearin= Nasal Congestion


GI Upset > 30mins: 2= Nausea/Diarrhea


Tremor Observation of Outstretched Hands: 2= Slight Tremor Visible


Yawning Observation: 0= None


Anxiety or Irritability: 2=Irritable/Anxious


Goose Flesh Skin: 0=Smooth Skin


COWS Score: 11





BHS Progress Note (SOAP)


Subjective: 





Irritability, interrupted sleep and weakness


Objective: 





10/07/18 13:56


 Vital Signs - 8 hr











  10/07/18 10/07/18 10/07/18





  06:47 09:12 13:19


 


Temperature 97.1 F L 97.9 F 97.9 F


 


Pulse Rate 51 L 70 71


 


Respiratory 18 20 18





Rate   


 


Blood Pressure 125/64 108/65 131/82








 Laboratory Last Values











WBC  6.1 K/mm3 (4.0-10.0)   10/06/18  08:00    


 


RBC  4.19 M/mm3 (4.00-5.60)   10/06/18  08:00    


 


Hgb  11.6 GM/dL (11.7-16.9)  L  10/06/18  08:00    


 


Hct  36.4 % (35.4-49)   10/06/18  08:00    


 


MCV  86.9 fl (80-96)   10/06/18  08:00    


 


MCH  27.7 pg (25.7-33.7)   10/06/18  08:00    


 


MCHC  31.9 g/dl (32.0-35.9)  L  10/06/18  08:00    


 


RDW  14.0 % (11.9-15.9)   10/06/18  08:00    


 


Plt Count  255 K/MM3 (134-434)   10/06/18  08:00    


 


MPV  7.5 fl (7.5-11.1)   10/06/18  08:00    


 


Sodium  137 mmol/L (136-145)   10/06/18  08:00    


 


Potassium  4.4 mmol/L (3.5-5.1)   10/06/18  08:00    


 


Chloride  105 mmol/L ()   10/06/18  08:00    


 


Carbon Dioxide  26 mmol/L (21-32)   10/06/18  08:00    


 


Anion Gap  7 MMOL/L (8-16)  L  10/06/18  08:00    


 


BUN  11 mg/dL (7-18)   10/06/18  08:00    


 


Creatinine  0.6 mg/dL (0.55-1.3)   10/06/18  08:00    


 


Creat Clearance w eGFR  > 60  (>60)   10/06/18  08:00    


 


POC Glucometer  152 UNITS ()   10/07/18  05:53    


 


Random Glucose  99 mg/dL ()   10/06/18  08:00    


 


Calcium  8.7 mg/dL (8.5-10.1)   10/06/18  08:00    


 


Total Bilirubin  0.3 mg/dL (0.2-1)   10/06/18  08:00    


 


AST  15 U/L (15-37)   10/06/18  08:00    


 


ALT  18 U/L (13-61)   10/06/18  08:00    


 


Alkaline Phosphatase  102 U/L ()   10/06/18  08:00    


 


Total Protein  7.1 g/dl (6.4-8.2)   10/06/18  08:00    


 


Albumin  3.2 g/dl (3.4-5.0)  L  10/06/18  08:00    


 


Urine Color  Fiordaliza   10/05/18  10:00    


 


Urine Appearance  Turbid   10/05/18  10:00    


 


Urine pH  5.0  (5.0-8.0)   10/05/18  10:00    


 


Ur Specific Gravity  1.028  (1.010-1.035)   10/05/18  10:00    


 


Urine Protein  Negative  (NEGATIVE)   10/05/18  10:00    


 


Urine Glucose (UA)  Negative  (NEGATIVE)   10/05/18  10:00    


 


Urine Ketones  Negative  (NEGATIVE)   10/05/18  10:00    


 


Urine Blood  Negative  (NEGATIVE)   10/05/18  10:00    


 


Urine Nitrite  Negative  (NEGATIVE)   10/05/18  10:00    


 


Urine Bilirubin  Negative  (<2.0 mg/dL)   10/05/18  10:00    


 


Urine Urobilinogen  Negative mg/dL (0.2-1.0)   10/05/18  10:00    


 


Ur Leukocyte Esterase  Negative  (NEGATIVE)   10/05/18  10:00    


 


RPR Titer  Nonreactive  (NONREACTIVE)   10/06/18  08:00    








Labs noted


Assessment: 





10/07/18 13:57


Withdrawal sx


Plan: 





Continue detox

## 2018-10-08 RX ADMIN — CIPROFLOXACIN HYDROCHLORIDE SCH DROP: 3 SOLUTION/ DROPS OPHTHALMIC at 22:04

## 2018-10-08 RX ADMIN — ATORVASTATIN CALCIUM SCH MG: 40 TABLET, FILM COATED ORAL at 22:03

## 2018-10-08 RX ADMIN — METFORMIN HYDROCHLORIDE SCH MG: 500 TABLET ORAL at 17:16

## 2018-10-08 RX ADMIN — Medication SCH MG: at 22:03

## 2018-10-08 RX ADMIN — LISINOPRIL SCH MG: 20 TABLET ORAL at 10:31

## 2018-10-08 RX ADMIN — Medication SCH TAB: at 10:31

## 2018-10-08 RX ADMIN — CIPROFLOXACIN HYDROCHLORIDE SCH DROP: 3 SOLUTION/ DROPS OPHTHALMIC at 10:32

## 2018-10-08 RX ADMIN — NICOTINE SCH MG: 14 PATCH, EXTENDED RELEASE TRANSDERMAL at 10:32

## 2018-10-08 RX ADMIN — ZOLPIDEM TARTRATE PRN MG: 10 TABLET, FILM COATED ORAL at 22:03

## 2018-10-08 RX ADMIN — METFORMIN HYDROCHLORIDE SCH MG: 500 TABLET ORAL at 07:35

## 2018-10-08 NOTE — PN
BHS Progress Note (SOAP)


Subjective: 





Sleep disturbance


Requesting to speak with counselor for aftercare plans


Objective: 





10/08/18 14:01


A & O x 3


Gait steady


 Vital Signs











Temperature  97.1 F L  10/08/18 13:25


 


Pulse Rate  77   10/08/18 13:25


 


Respiratory Rate  18   10/08/18 13:25


 


Blood Pressure  121/78   10/08/18 13:25


 


O2 Sat by Pulse Oximetry (%)      











Assessment: 





10/08/18 14:01


withdrawal sx


Plan: 





continue detox


Directed to counselor in day room

## 2018-10-09 RX ADMIN — METFORMIN HYDROCHLORIDE SCH MG: 500 TABLET ORAL at 17:18

## 2018-10-09 RX ADMIN — Medication SCH TAB: at 10:30

## 2018-10-09 RX ADMIN — CIPROFLOXACIN HYDROCHLORIDE SCH DROP: 3 SOLUTION/ DROPS OPHTHALMIC at 10:30

## 2018-10-09 RX ADMIN — LISINOPRIL SCH MG: 20 TABLET ORAL at 10:30

## 2018-10-09 RX ADMIN — ZOLPIDEM TARTRATE PRN MG: 10 TABLET, FILM COATED ORAL at 22:49

## 2018-10-09 RX ADMIN — ATORVASTATIN CALCIUM SCH MG: 40 TABLET, FILM COATED ORAL at 22:48

## 2018-10-09 RX ADMIN — NICOTINE SCH MG: 14 PATCH, EXTENDED RELEASE TRANSDERMAL at 10:30

## 2018-10-09 RX ADMIN — CIPROFLOXACIN HYDROCHLORIDE SCH DROP: 3 SOLUTION/ DROPS OPHTHALMIC at 22:47

## 2018-10-09 RX ADMIN — METFORMIN HYDROCHLORIDE SCH MG: 500 TABLET ORAL at 06:23

## 2018-10-09 RX ADMIN — Medication SCH MG: at 22:48

## 2018-10-09 NOTE — PN
BHS Progress Note (SOAP)


Subjective: 





Sweating, interrupted sleep


Objective: 





10/09/18 15:16


 Last Vital Signs











Temp Pulse Resp BP Pulse Ox


 


 96.5 F L  74   18   143/89    


 


 10/09/18 14:18  10/09/18 14:18  10/09/18 14:18  10/09/18 14:18   








 Laboratory Tests











  10/05/18 10/05/18 10/05/18





  10:00 17:29 22:06


 


WBC   


 


RBC   


 


Hgb   


 


Hct   


 


MCV   


 


MCH   


 


MCHC   


 


RDW   


 


Plt Count   


 


MPV   


 


Sodium   


 


Potassium   


 


Chloride   


 


Carbon Dioxide   


 


Anion Gap   


 


BUN   


 


Creatinine   


 


Creat Clearance w eGFR   


 


POC Glucometer   99  103


 


Random Glucose   


 


Calcium   


 


Total Bilirubin   


 


AST   


 


ALT   


 


Alkaline Phosphatase   


 


Total Protein   


 


Albumin   


 


Urine Color  Fiordaliza  


 


Urine Appearance  Turbid  


 


Urine pH  5.0  


 


Ur Specific Gravity  1.028  


 


Urine Protein  Negative  


 


Urine Glucose (UA)  Negative  


 


Urine Ketones  Negative  


 


Urine Blood  Negative  


 


Urine Nitrite  Negative  


 


Urine Bilirubin  Negative  


 


Urine Urobilinogen  Negative  


 


Ur Leukocyte Esterase  Negative  


 


RPR Titer   














  10/06/18 10/06/18 10/06/18





  05:32 08:00 08:00


 


WBC   6.1 


 


RBC   4.19 


 


Hgb   11.6 L 


 


Hct   36.4 


 


MCV   86.9 


 


MCH   27.7 


 


MCHC   31.9 L 


 


RDW   14.0 


 


Plt Count   255 


 


MPV   7.5 


 


Sodium    137


 


Potassium    4.4


 


Chloride    105


 


Carbon Dioxide    26


 


Anion Gap    7 L


 


BUN    11


 


Creatinine    0.6


 


Creat Clearance w eGFR    > 60


 


POC Glucometer  110  


 


Random Glucose    99


 


Calcium    8.7


 


Total Bilirubin    0.3


 


AST    15


 


ALT    18


 


Alkaline Phosphatase    102


 


Total Protein    7.1


 


Albumin    3.2 L


 


Urine Color   


 


Urine Appearance   


 


Urine pH   


 


Ur Specific Gravity   


 


Urine Protein   


 


Urine Glucose (UA)   


 


Urine Ketones   


 


Urine Blood   


 


Urine Nitrite   


 


Urine Bilirubin   


 


Urine Urobilinogen   


 


Ur Leukocyte Esterase   


 


RPR Titer   














  10/06/18 10/06/18 10/07/18





  08:00 16:37 05:53


 


WBC   


 


RBC   


 


Hgb   


 


Hct   


 


MCV   


 


MCH   


 


MCHC   


 


RDW   


 


Plt Count   


 


MPV   


 


Sodium   


 


Potassium   


 


Chloride   


 


Carbon Dioxide   


 


Anion Gap   


 


BUN   


 


Creatinine   


 


Creat Clearance w eGFR   


 


POC Glucometer   109  152


 


Random Glucose   


 


Calcium   


 


Total Bilirubin   


 


AST   


 


ALT   


 


Alkaline Phosphatase   


 


Total Protein   


 


Albumin   


 


Urine Color   


 


Urine Appearance   


 


Urine pH   


 


Ur Specific Gravity   


 


Urine Protein   


 


Urine Glucose (UA)   


 


Urine Ketones   


 


Urine Blood   


 


Urine Nitrite   


 


Urine Bilirubin   


 


Urine Urobilinogen   


 


Ur Leukocyte Esterase   


 


RPR Titer  Nonreactive  














  10/07/18 10/08/18 10/08/18





  16:51 05:52 16:19


 


WBC   


 


RBC   


 


Hgb   


 


Hct   


 


MCV   


 


MCH   


 


MCHC   


 


RDW   


 


Plt Count   


 


MPV   


 


Sodium   


 


Potassium   


 


Chloride   


 


Carbon Dioxide   


 


Anion Gap   


 


BUN   


 


Creatinine   


 


Creat Clearance w eGFR   


 


POC Glucometer  182  152  225


 


Random Glucose   


 


Calcium   


 


Total Bilirubin   


 


AST   


 


ALT   


 


Alkaline Phosphatase   


 


Total Protein   


 


Albumin   


 


Urine Color   


 


Urine Appearance   


 


Urine pH   


 


Ur Specific Gravity   


 


Urine Protein   


 


Urine Glucose (UA)   


 


Urine Ketones   


 


Urine Blood   


 


Urine Nitrite   


 


Urine Bilirubin   


 


Urine Urobilinogen   


 


Ur Leukocyte Esterase   


 


RPR Titer   














  10/09/18





  05:42


 


WBC 


 


RBC 


 


Hgb 


 


Hct 


 


MCV 


 


MCH 


 


MCHC 


 


RDW 


 


Plt Count 


 


MPV 


 


Sodium 


 


Potassium 


 


Chloride 


 


Carbon Dioxide 


 


Anion Gap 


 


BUN 


 


Creatinine 


 


Creat Clearance w eGFR 


 


POC Glucometer  205


 


Random Glucose 


 


Calcium 


 


Total Bilirubin 


 


AST 


 


ALT 


 


Alkaline Phosphatase 


 


Total Protein 


 


Albumin 


 


Urine Color 


 


Urine Appearance 


 


Urine pH 


 


Ur Specific Gravity 


 


Urine Protein 


 


Urine Glucose (UA) 


 


Urine Ketones 


 


Urine Blood 


 


Urine Nitrite 


 


Urine Bilirubin 


 


Urine Urobilinogen 


 


Ur Leukocyte Esterase 


 


RPR Titer 








Labs reviewed: increased glucose noted


Assessment: 





10/09/18 15:17


Withdrawal sxs





Noted with hyperglycemia


Plan: 





Continue detox





Hyperglycemia secondary to DMT2: continue present regimen, encourage adherence 

to diabetic diet

## 2018-10-10 VITALS — HEART RATE: 61 BPM | DIASTOLIC BLOOD PRESSURE: 80 MMHG | SYSTOLIC BLOOD PRESSURE: 147 MMHG | TEMPERATURE: 97.5 F

## 2018-10-10 RX ADMIN — METFORMIN HYDROCHLORIDE SCH MG: 500 TABLET ORAL at 07:17

## 2018-10-10 NOTE — DS
BHS Detox Discharge Summary


Admission Date: 


10/05/18





Discharge Date: 10/10/18





- History


Present History: Opioid Dependence


Pertinent Past History: 





DMT2





HTN





Hepatitis C





HLD





- Physical Exam Results


Vital Signs: 


 Vital Signs











Temperature  97.5 F L  10/10/18 06:48


 


Pulse Rate  61   10/10/18 06:48


 


Respiratory Rate  18   10/10/18 06:48


 


Blood Pressure  147/80   10/10/18 06:48


 


O2 Sat by Pulse Oximetry (%)      











Pertinent Admission Physical Exam Findings: 





Withdrawal sxs





 Laboratory Tests











  10/05/18 10/05/18 10/05/18





  10:00 17:29 22:06


 


WBC   


 


RBC   


 


Hgb   


 


Hct   


 


MCV   


 


MCH   


 


MCHC   


 


RDW   


 


Plt Count   


 


MPV   


 


Sodium   


 


Potassium   


 


Chloride   


 


Carbon Dioxide   


 


Anion Gap   


 


BUN   


 


Creatinine   


 


Creat Clearance w eGFR   


 


POC Glucometer   99  103


 


Random Glucose   


 


Calcium   


 


Total Bilirubin   


 


AST   


 


ALT   


 


Alkaline Phosphatase   


 


Total Protein   


 


Albumin   


 


Urine Color  Fiordaliza  


 


Urine Appearance  Turbid  


 


Urine pH  5.0  


 


Ur Specific Gravity  1.028  


 


Urine Protein  Negative  


 


Urine Glucose (UA)  Negative  


 


Urine Ketones  Negative  


 


Urine Blood  Negative  


 


Urine Nitrite  Negative  


 


Urine Bilirubin  Negative  


 


Urine Urobilinogen  Negative  


 


Ur Leukocyte Esterase  Negative  


 


RPR Titer   














  10/06/18 10/06/18 10/06/18





  05:32 08:00 08:00


 


WBC   6.1 


 


RBC   4.19 


 


Hgb   11.6 L 


 


Hct   36.4 


 


MCV   86.9 


 


MCH   27.7 


 


MCHC   31.9 L 


 


RDW   14.0 


 


Plt Count   255 


 


MPV   7.5 


 


Sodium    137


 


Potassium    4.4


 


Chloride    105


 


Carbon Dioxide    26


 


Anion Gap    7 L


 


BUN    11


 


Creatinine    0.6


 


Creat Clearance w eGFR    > 60


 


POC Glucometer  110  


 


Random Glucose    99


 


Calcium    8.7


 


Total Bilirubin    0.3


 


AST    15


 


ALT    18


 


Alkaline Phosphatase    102


 


Total Protein    7.1


 


Albumin    3.2 L


 


Urine Color   


 


Urine Appearance   


 


Urine pH   


 


Ur Specific Gravity   


 


Urine Protein   


 


Urine Glucose (UA)   


 


Urine Ketones   


 


Urine Blood   


 


Urine Nitrite   


 


Urine Bilirubin   


 


Urine Urobilinogen   


 


Ur Leukocyte Esterase   


 


RPR Titer   














  10/06/18 10/06/18 10/07/18





  08:00 16:37 05:53


 


WBC   


 


RBC   


 


Hgb   


 


Hct   


 


MCV   


 


MCH   


 


MCHC   


 


RDW   


 


Plt Count   


 


MPV   


 


Sodium   


 


Potassium   


 


Chloride   


 


Carbon Dioxide   


 


Anion Gap   


 


BUN   


 


Creatinine   


 


Creat Clearance w eGFR   


 


POC Glucometer   109  152


 


Random Glucose   


 


Calcium   


 


Total Bilirubin   


 


AST   


 


ALT   


 


Alkaline Phosphatase   


 


Total Protein   


 


Albumin   


 


Urine Color   


 


Urine Appearance   


 


Urine pH   


 


Ur Specific Gravity   


 


Urine Protein   


 


Urine Glucose (UA)   


 


Urine Ketones   


 


Urine Blood   


 


Urine Nitrite   


 


Urine Bilirubin   


 


Urine Urobilinogen   


 


Ur Leukocyte Esterase   


 


RPR Titer  Nonreactive  














  10/07/18 10/08/18 10/08/18





  16:51 05:52 16:19


 


WBC   


 


RBC   


 


Hgb   


 


Hct   


 


MCV   


 


MCH   


 


MCHC   


 


RDW   


 


Plt Count   


 


MPV   


 


Sodium   


 


Potassium   


 


Chloride   


 


Carbon Dioxide   


 


Anion Gap   


 


BUN   


 


Creatinine   


 


Creat Clearance w eGFR   


 


POC Glucometer  182  152  225


 


Random Glucose   


 


Calcium   


 


Total Bilirubin   


 


AST   


 


ALT   


 


Alkaline Phosphatase   


 


Total Protein   


 


Albumin   


 


Urine Color   


 


Urine Appearance   


 


Urine pH   


 


Ur Specific Gravity   


 


Urine Protein   


 


Urine Glucose (UA)   


 


Urine Ketones   


 


Urine Blood   


 


Urine Nitrite   


 


Urine Bilirubin   


 


Urine Urobilinogen   


 


Ur Leukocyte Esterase   


 


RPR Titer   














  10/09/18 10/09/18 10/10/18





  05:42 16:19 05:49


 


WBC   


 


RBC   


 


Hgb   


 


Hct   


 


MCV   


 


MCH   


 


MCHC   


 


RDW   


 


Plt Count   


 


MPV   


 


Sodium   


 


Potassium   


 


Chloride   


 


Carbon Dioxide   


 


Anion Gap   


 


BUN   


 


Creatinine   


 


Creat Clearance w eGFR   


 


POC Glucometer  205  298  199


 


Random Glucose   


 


Calcium   


 


Total Bilirubin   


 


AST   


 


ALT   


 


Alkaline Phosphatase   


 


Total Protein   


 


Albumin   


 


Urine Color   


 


Urine Appearance   


 


Urine pH   


 


Ur Specific Gravity   


 


Urine Protein   


 


Urine Glucose (UA)   


 


Urine Ketones   


 


Urine Blood   


 


Urine Nitrite   


 


Urine Bilirubin   


 


Urine Urobilinogen   


 


Ur Leukocyte Esterase   


 


RPR Titer   








Labs reviewed 





- Treatment


Hospital Course: Detox Protocol Followed, Detoxed Safely, Responded well, 

Discharged Condition Good





- Medication


Discharge Medications: 


Ambulatory Orders





metFORMIN HCL [Glucophage -] 500 mg PO BID 11/25/16 


Atorvastatin Ca [Lipitor] 40 mg PO HS 03/21/18 


Ciprofloxacin 0.3% Eye Drops [Ciloxan 0.3% Eye Drops --] 1 drop OU BID 03/21/18 


Lisinopril 1 tab PO DAILY 03/21/18 


Quetiapine Fumarate [Seroquel -] 200 mg PO HS #30 tab 03/21/18 


Zolpidem Tartrate [Ambien] 10 mg PO HS 10/05/18 











- Diagnosis


(1) Type 2 diabetes mellitus with hyperglycemia


Status: Chronic   





(2) Insomnia


Status: Acute   


Qualifiers: 


   Insomnia type: drug-induced   Qualified Code(s): F19.982 - Other 

psychoactive substance use, unspecified with psychoactive substance-induced 

sleep disorder   





(3) Nicotine dependence


Status: Chronic   





(4) Opioid dependence with withdrawal


Status: Acute   





(5) Substance induced mood disorder


Status: Acute   





(6) Bipolar disorder


Status: Chronic   





(7) HLD (hyperlipidemia)


Status: Chronic   


Qualifiers: 


   Hyperlipidemia type: pure hypercholesterolemia   Qualified Code(s): E78.00 - 

Pure hypercholesterolemia, unspecified; E78.0 - Pure hypercholesterolemia   





(8) Hypertension


Status: Chronic   


Qualifiers: 


   Hypertension type: essential hypertension   Qualified Code(s): I10 - 

Essential (primary) hypertension   





(9) Depression (emotion)


Status: Chronic   


Qualifiers: 


   Depression Type: dysthymia   Qualified Code(s): F34.1 - Dysthymic disorder   





(10) Hepatitis C


Status: Chronic   


Qualifiers: 


   Viral hepatitis chronicity: chronic   Hepatic coma status: without hepatic 

coma   Qualified Code(s): B18.2 - Chronic viral hepatitis C   





- AMA


Did Patient Leave Against Medical Advice: No (F/U with your PCP within 1-2 weeks

)

## 2019-03-04 ENCOUNTER — HOSPITAL ENCOUNTER (INPATIENT)
Dept: HOSPITAL 74 - YASAS | Age: 59
LOS: 4 days | Discharge: HOME | DRG: 897 | End: 2019-03-08
Attending: SURGERY | Admitting: SURGERY
Payer: COMMERCIAL

## 2019-03-04 VITALS — BODY MASS INDEX: 24.7 KG/M2

## 2019-03-04 DIAGNOSIS — B96.89: ICD-10-CM

## 2019-03-04 DIAGNOSIS — I10: ICD-10-CM

## 2019-03-04 DIAGNOSIS — H10.33: ICD-10-CM

## 2019-03-04 DIAGNOSIS — F19.282: ICD-10-CM

## 2019-03-04 DIAGNOSIS — F11.23: Primary | ICD-10-CM

## 2019-03-04 DIAGNOSIS — L02.511: ICD-10-CM

## 2019-03-04 DIAGNOSIS — F17.210: ICD-10-CM

## 2019-03-04 DIAGNOSIS — Z79.84: ICD-10-CM

## 2019-03-04 DIAGNOSIS — F19.24: ICD-10-CM

## 2019-03-04 DIAGNOSIS — L02.413: ICD-10-CM

## 2019-03-04 DIAGNOSIS — E11.9: ICD-10-CM

## 2019-03-04 DIAGNOSIS — E78.5: ICD-10-CM

## 2019-03-04 PROCEDURE — HZ2ZZZZ DETOXIFICATION SERVICES FOR SUBSTANCE ABUSE TREATMENT: ICD-10-PCS | Performed by: SURGERY

## 2019-03-04 RX ADMIN — ATORVASTATIN CALCIUM SCH MG: 40 TABLET, FILM COATED ORAL at 22:45

## 2019-03-04 RX ADMIN — Medication SCH MG: at 22:45

## 2019-03-04 NOTE — HP
COWS





- Scale


Resting Pulse: 0= [place] 80 or Below


Sweatin= No chills or Flushing


Restless Observation: 1= Difficult to Sit Still


Pupil Size: 0= Normal to Room Light


Bone or Joint Aches: 0= None


Runny Nose/ Eye Tearin= Runny Nose/Eyes


GI Upset > 30mins: 0= None


Tremor Observation: 0= None


Yawning Observation: 2= >3x During Session


Anxiety or Irritability: 2=Irritable/Anxious


Goose Flesh Skin: 0=Smooth Skin


COWS Score: 7





CIWA Score





- Admission Criteria


[facility] Guidelines: Admission for Medically Managed Detox: 


Requires at least one of the followin. CIWA greater than 12


2. Seizures within the past 24 hours


3. Delirium tremens within the past 24 hours


4. Hallucinations within the past 24 hours


5. Acute intervention needed for co  occurring medical disorder


6. Acute intervention needed for co  occurring psychiatric disorder


7. Severe withdrawal that cannot be handled at a lower level of care (continued


    vomiting, continued diarrhea, abnormal vital signs) requiring intravenous


    medication and/or fluids


8. Pregnancy








Admission ROS [facility]





- [facility] [place]


Chief Complaint: 





c/o worsening withdrawal sx's. seeking detox txment


Allergies/Adverse Reactions: 


 Allergies











Allergy/AdvReac Type Severity Reaction Status Date / Time


 


No Known Allergies Allergy   Verified 19 18:57











History of Present Illness: 





58 y.o. male with hx/o opioid dependence here for detox. Client reports he is 

using heroin. He is self referred. Known to the program. Last admission 10/ 

2018. his utox is postive for fentanyl, opiates, bzo. Client denies benzo use. 

States last used 3 months ago. Cows 8. recently discontinued from [facility] due 

to his continual heroin use. Reports longest clean time 3 years. Denies hx/o 

drug overdose, seizures, past/present si/hi, avh. Lives alone, unemployed-ssd, 

denies legals.








pmhx- dm, htn, hld, 


psyc- denies


Exam Limitations: No Limitations





- Ebola screening


Have you traveled outside of the country in the last 21 days: No


Have you had contact with anyone from an Ebola affected area: No


Have you been sick,other than usual withdrawal symptoms: No


Do you have a fever: No





- Review of Systems


Constitutional: Chills, Changes in sleep


EENT: reports: Other (gingilitis of both eyes)


Respiratory: reports: No Symptoms reported


Cardiac: reports: No Symptoms Reported


GI: reports: No Symptoms Reported


: reports: No Symptoms Reported


Musculoskeletal: reports: No Symptoms Reported


Integumentary: reports: Rash (ezcema)


Neuro: reports: No Symptoms reported


Endocrine: reports: No Symptoms Reported


Hematology: reports: No Symptoms Reported


Psychiatric: reports: No Sypmtoms Reported


Other Systems: Reviewed and Negative





Patient History





- Patient Medical History


Hx Anemia: No


Hx Asthma: No


Hx Chronic Obstructive Pulmonary Disease (COPD): No


Hx Cancer: No


Hx Cardiac Disorders: No


Hx Congestive Heart Failure: No


Hx Hypertension: Yes (lisinopril)


Hx Hypercholesterolemia: Yes (lipitor)


Hx Pacemaker: No


HX Cerebrovascular Accident: No


Hx Seizures: No


Hx Dementia: No


Hx Diabetes: Yes (Metformin)


Hx Gastrointestinal Disorders: No


Hx Liver Disease: Yes (Hep C- no txment)


Hx Genitourinary Disorders: No


Hx Sexually Transmitted Disorders: No


Hx Renal Disease (ESRD): No


Hx Thyroid Disease: No


Hx Human Immunodeficiency Virus (HIV): No


Hx Hepatitis C: Yes (no txment)


Hx Depression: Yes


Hx Suicide Attempt: No


Hx Bipolar Disorder: No


Hx Schizophrenia: No


Other Medical History: gingivits of eyes





- Patient Surgical History


Past Surgical History: No


Hx Neurologic Surgery: No


Hx Cataract Extraction: No


Hx Cardiac Surgery: No


Hx Lung Surgery: No


Hx Breast Surgery: No


Hx Breast Biopsy: No


Hx Abdominal Surgery: No


Hx Appendectomy: No


Hx Cholecystectomy: No


Hx Genitourinary Surgery: No


Hx  Section: No


Hx Orthopedic Surgery: No


Anesthesia Reaction: No





- PPD History


Previous Implant?: Yes


Documented Results: Negative w/proof


Date: 17


Results: NEGATIVE


PPD to be Administered?: Yes





- Smoking Cessation


Smoking history: Current every day smoker


Have you smoked in the past 12 months: Yes


Aproximately how many cigarettes per day: 8


Cigars Per Day: 0


Hx Chewing Tobacco Use: No


Initiated information on smoking cessation: Yes


'Breaking Loose' booklet given: 19





- Substance & Tx. History


Hx Alcohol Use: No


Hx Substance Use: Yes


Substance Use Type: Heroin


Hx Substance Use Treatment: Yes ([facility])





- Substances Abused


  ** heroin


Route: Oral


Frequency: Daily


Amount used: 4 bags


Age of first use: 42


Date of Last Use: 19





  ** Alcohol


Route: Oral


Frequency: Daily


Amount used: 4 bags


Age of first use: 42


Date of Last Use: 19





Family Disease History





- Family Disease History


Family Disease History: Diabetes: Father ()





Admission Physical Exam [facility]





- Vital Signs


Vital Signs: 


 Vital Signs - 24 hr











  19





  17:06


 


Temperature 97.7 F


 


Pulse Rate 77


 


Respiratory 18





Rate 


 


Blood Pressure 126/72














- Physical


General Appearance: Yes: Appropriately Dressed, Mild Distress, Irritable


HEENTM: Yes: Normocephalic, [place], Pharynx Normal, Other (bilateral conjunctival 

redness (chronic condition) poor dention missing most of his teeth)


Respiratory: Yes: Chest Non-Tender, Lungs Clear, Normal Breath Sounds, No 

Respiratory Distress, No Accessory Muscle Use


Neck: Yes: No masses,lesions,Nodules, Supple, Trachea in good position


Breast: Yes: Breast Exam Deferred


Cardiology: Yes: Regular Rhythm, Regular Rate


Abdominal: Yes: Non Tender, Soft, Increased Bowel Sounds


Genitourinary: Yes: Other (no c/o offered)


Back: Yes: Normal Inspection


Musculoskeletal: Yes: full range of Motion


Extremities: Yes: Normal Range of Motion


Neurological: Yes: Fully Oriented, Alert, Normal Mood/Affect


Integumentary: Yes: Warm, Erythema (of right forearm with indurated areas and 

scarring from old abcesses), Track Marks


Lymphatic: Yes: Within Normal Limits





- Diagnostic


(1) Insomnia


Current Visit: Yes   Status: Chronic   


Qualifiers: 


   Insomnia type: drug-induced   Qualified Code(s): F19.982 - Other 

psychoactive substance use, unspecified with psychoactive substance-induced 

sleep disorder   





(2) Opioid dependence with withdrawal


Current Visit: Yes   Status: Acute   





(3) Substance induced mood disorder


Current Visit: Yes   Status: Chronic   





(4) Abscess of right forearm


Current Visit: Yes   Status: Acute   





(5) Conjunctivitis, both eyes


Current Visit: No   Status: Chronic   


Qualifiers: 


   Conjunctivitis type: acute   Acute conjunctivitis type: bacterial   

Qualified Code(s): H10.33 - Unspecified acute conjunctivitis, bilateral   





(6) Dry eye


Current Visit: Yes   Status: Chronic   





(7) HLD (hyperlipidemia)


Current Visit: Yes   Status: Chronic   


Qualifiers: 


   Hyperlipidemia type: pure hypercholesterolemia   Qualified Code(s): E78.00 - 

Pure hypercholesterolemia, unspecified; E78.0 - Pure hypercholesterolemia   





(8) Hepatitis C


Current Visit: Yes   Status: Chronic   


Qualifiers: 


   Viral hepatitis chronicity: chronic   Hepatic coma status: without hepatic 

coma   Qualified Code(s): B18.2 - Chronic viral hepatitis C   





(9) Hypertension


Current Visit: Yes   Status: Chronic   


Qualifiers: 


   Hypertension type: essential hypertension   Qualified Code(s): I10 - 

Essential (primary) hypertension   





(10) Nicotine dependence


Current Visit: Yes   Status: Chronic   


Qualifiers: 


   Nicotine product type: cigarettes   Substance use status: uncomplicated   

Qualified Code(s): F17.210 - Nicotine dependence, cigarettes, uncomplicated   





(11) Type 2 diabetes mellitus


Current Visit: Yes   Status: Chronic   


Qualifiers: 


   Diabetes mellitus long term insulin use: with long term use   Diabetes 

mellitus complication status: without complication   Qualified Code(s): E11.9 - 

Type 2 diabetes mellitus without complications; Z79.4 - Long term (current) use 

of insulin   





Cleared for Admission [facility]





- Detox or Rehab


[facility] Level of Care: Medically Managed


Detox Regimen/Protocol: Methadone


Claeared for Rehab Admission: No





S Breath Alcohol Content


Breath Alcohol Content: 0





Urine Drug Screen





- Results


Drug Screen Negative: No


Urine Drug Screen Results: OPI-Opiates, BZO-Benzodiazepines, FEN-Fentanyl





Inpatient Rehab Admission





- Rehab Decision to Admit


Inpatient rehab admission?: No

## 2019-03-05 LAB
ALBUMIN SERPL-MCNC: 3.1 G/DL (ref 3.4–5)
ALP SERPL-CCNC: 154 U/L (ref 45–117)
ALT SERPL-CCNC: 26 U/L (ref 13–61)
ANION GAP SERPL CALC-SCNC: 7 MMOL/L (ref 8–16)
APPEARANCE UR: (no result)
AST SERPL-CCNC: 22 U/L (ref 15–37)
BILIRUB SERPL-MCNC: 0.6 MG/DL (ref 0.2–1)
BILIRUB UR STRIP.AUTO-MCNC: NEGATIVE MG/DL
BUN SERPL-MCNC: 16 MG/DL (ref 7–18)
CALCIUM SERPL-MCNC: 8.4 MG/DL (ref 8.5–10.1)
CHLORIDE SERPL-SCNC: 102 MMOL/L (ref 98–107)
CO2 SERPL-SCNC: 27 MMOL/L (ref 21–32)
COLOR UR: YELLOW
CREAT SERPL-MCNC: 0.7 MG/DL (ref 0.55–1.3)
DEPRECATED RDW RBC AUTO: 14.6 % (ref 11.9–15.9)
GLUCOSE SERPL-MCNC: 180 MG/DL (ref 74–106)
HCT VFR BLD CALC: 37.3 % (ref 35.4–49)
HGB BLD-MCNC: 12.3 GM/DL (ref 11.7–16.9)
KETONES UR QL STRIP: NEGATIVE
LEUKOCYTE ESTERASE UR QL STRIP.AUTO: NEGATIVE
MCH RBC QN AUTO: 28.1 PG (ref 25.7–33.7)
MCHC RBC AUTO-ENTMCNC: 33.1 G/DL (ref 32–35.9)
MCV RBC: 85 FL (ref 80–96)
NITRITE UR QL STRIP: NEGATIVE
PH UR: 5 [PH] (ref 5–8)
PLATELET # BLD AUTO: 188 K/MM3 (ref 134–434)
PMV BLD: 7.6 FL (ref 7.5–11.1)
POTASSIUM SERPLBLD-SCNC: 4.3 MMOL/L (ref 3.5–5.1)
PROT SERPL-MCNC: 6.9 G/DL (ref 6.4–8.2)
PROT UR QL STRIP: NEGATIVE
PROT UR QL STRIP: NEGATIVE
RBC # BLD AUTO: 4.39 M/MM3 (ref 4–5.6)
SODIUM SERPL-SCNC: 136 MMOL/L (ref 136–145)
SP GR UR: 1.03 (ref 1.01–1.03)
UROBILINOGEN UR STRIP-MCNC: 2 MG/DL (ref 0.2–1)
WBC # BLD AUTO: 4.8 K/MM3 (ref 4–10)

## 2019-03-05 RX ADMIN — ATORVASTATIN CALCIUM SCH MG: 40 TABLET, FILM COATED ORAL at 22:20

## 2019-03-05 RX ADMIN — LISINOPRIL SCH MG: 20 TABLET ORAL at 10:07

## 2019-03-05 RX ADMIN — CEPHALEXIN SCH MG: 500 CAPSULE ORAL at 00:12

## 2019-03-05 RX ADMIN — CEPHALEXIN SCH MG: 500 CAPSULE ORAL at 12:57

## 2019-03-05 RX ADMIN — Medication SCH MG: at 22:20

## 2019-03-05 RX ADMIN — METFORMIN HYDROCHLORIDE SCH MG: 500 TABLET ORAL at 06:07

## 2019-03-05 RX ADMIN — Medication SCH TAB: at 10:07

## 2019-03-05 RX ADMIN — CEPHALEXIN SCH MG: 500 CAPSULE ORAL at 18:50

## 2019-03-05 RX ADMIN — NICOTINE SCH: 14 PATCH, EXTENDED RELEASE TRANSDERMAL at 10:07

## 2019-03-05 RX ADMIN — CEPHALEXIN SCH MG: 500 CAPSULE ORAL at 05:12

## 2019-03-05 RX ADMIN — Medication PRN MG: at 22:20

## 2019-03-05 RX ADMIN — METFORMIN HYDROCHLORIDE SCH MG: 500 TABLET ORAL at 16:43

## 2019-03-05 NOTE — CONSULT
[facility] Psychiatric Consult





- Data


Date of interview: 03/05/19


Admission source: [facility]


Identifying data: Patient is a 58 year old single male, without children, 

unemployed, domiciled, and is supported by [facility]. This is one of multiple 

admissions for patient. Patient admitted to  for opiate dependence.


Substance Abuse History: Smoking Cessation.  Smoking history: Current every day 

smoker.  Have you smoked in the past 12 months: Yes.  Aproximately how many 

cigarettes per day: 8.  Cigars Per Day: 0.  Hx Chewing Tobacco Use: No.  

Initiated information on smoking cessation: Yes.  'Breaking Loose' booklet given

: 03/04/19.  - Substance & Tx. History.  Hx Alcohol Use: No.  Hx Substance Use: 

Yes.  Substance Use Type: Heroin.  Hx Substance Use Treatment: Yes ([facility]).  - 

Substances Abused.  ** heroin.  Route: Oral.  Frequency: Daily.  Amount used: 4 

bags.  Age of first use: 42.  Date of Last Use: 03/04/19.  ** Alcohol.  Route: 

Oral.  Frequency: Daily.  Amount used: 4 bags.  Age of first use: 42.  Date of 

Last Use: 03/04/19


Medical History: hypertension, hypercholesterolemia, diabetes, Hep C


Psychiatric History: Patient denies h/o  psychiatric hospitalization and 

suicide attempt. He reports past outpatient psychiatric treatment at [facility], most recently four months ago. States he used to accept seroquel but 

discontinued medication because he did not like how it made him feel. Patient 

is noncompliant with medications. As per Dr. Johnson note on 10/6/18, patient 

reported h/o multiple psychiatric hospitalizations ([facility]). At present, patient is experiencing difficulty sleeping.


Physical/Sexual Abuse/Trauma History: denies.





Mental Status Exam





- Mental Status Exam


Alert and Oriented to: Time, Place, Person


Cognitive Function: Fair


Patient Appearance: Unkempt


Mood: Withdrawn


Affect: Mood Congruent


Patient Behavior: Fatigued


Speech Pattern: Appropriate


Voice Loudness: Moderately Soft/Quiet


Thought Process: Intact, Goal Oriented


Thought Disorder: Not Present


Hallucinations: Denies


Suicidal Ideation: Denies


Homicidal Ideation: Denies


Insight/Judgement: Poor


Sleep: Poorly


Appetite: Fair


Muscle strength/Tone: Normal


Gait/Station: Normal





Psychiatric Findings





- Problem List (Axis 1, 2,3)


(1) Substance-induced sleep disorder


Current Visit: Yes   Status: Acute   





(2) Opioid dependence with withdrawal


Current Visit: Yes   Status: Acute   





(3) Nicotine dependence


Current Visit: Yes   Status: Chronic   


Qualifiers: 


   Nicotine product type: cigarettes   Substance use status: uncomplicated   

Qualified Code(s): F17.210 - Nicotine dependence, cigarettes, uncomplicated   





(4) Substance induced mood disorder


Current Visit: Yes   Status: Acute   





- Initial Treatment Plan


Initial Treatment Plan: Psychoeducation provided. Detoxification in progress. 

Will increase Melatonin 5mg to 10mg qhs. Benefits and side effects discussed. 

Verbal consent given.

## 2019-03-05 NOTE — PN
[facility] COWS





- Scale


Resting Pulse: 0= [place] 80 or Below


Sweatin= Chills/Flushing


Restless Observation: 1= Difficult to Sit Still


Pupil Size: 1= Pupils >than Normal


Bone or Joint Aches: 1= Mild Discomfort


Runny Nose/ Eye Tearin= Nasal Congestion


GI Upset > 30mins: 1= Stomach Cramp


Tremor Observation of Outstretched Hands: 1= Tremor Felt, Not Seen


Yawning Observation: 1= 1-2x During Session


Anxiety or Irritability: 1=Feels Anxious/Irritable


Goose Flesh Skin: 0=Smooth Skin


COWS Score: 9





BHS Progress Note (SOAP)


Subjective: 





body aches joints pain tremor mild sweating


Objective: 





19 14:42


 Vital Signs











Temperature  98 F   19 14:04


 


Pulse Rate  72   19 14:04


 


Respiratory Rate  18   19 14:04


 


Blood Pressure  116/69   19 14:04


 


O2 Sat by Pulse Oximetry (%)      








 Laboratory Last Values











WBC  4.8 K/mm3 (4.0-10.0)   19  07:00    


 


RBC  4.39 M/mm3 (4.00-5.60)   19  07:00    


 


Hgb  12.3 GM/dL (11.7-16.9)   19  07:00    


 


Hct  37.3 % (35.4-49)   19  07:00    


 


MCV  85.0 fl (80-96)   19  07:00    


 


MCH  28.1 pg (25.7-33.7)   19  07:00    


 


MCHC  33.1 g/dl (32.0-35.9)   19  07:00    


 


RDW  14.6 % (11.9-15.9)   19  07:00    


 


Plt Count  188 K/MM3 (134-434)  D 19  07:00    


 


MPV  7.6 fl (7.5-11.1)   19  07:00    


 


Sodium  136 mmol/L (136-145)   19  07:00    


 


Potassium  4.3 mmol/L (3.5-5.1)   19  07:00    


 


Chloride  102 mmol/L ()   19  07:00    


 


Carbon Dioxide  27 mmol/L (21-32)   19  07:00    


 


Anion Gap  7 MMOL/L (8-16)  L  19  07:00    


 


BUN  16 mg/dL (7-18)   19  07:00    


 


Creatinine  0.7 mg/dL (0.55-1.3)   19  07:00    


 


Creat Clearance w eGFR  > 60  (>60)   19  07:00    


 


POC Glucometer  232 UNITS ()   19  05:11    


 


Random Glucose  180 mg/dL ()  H  19  07:00    


 


Calcium  8.4 mg/dL (8.5-10.1)  L  19  07:00    


 


Total Bilirubin  0.6 mg/dL (0.2-1)   19  07:00    


 


AST  22 U/L (15-37)   19  07:00    


 


ALT  26 U/L (13-61)   19  07:00    


 


Alkaline Phosphatase  154 U/L ()  H  19  07:00    


 


Total Protein  6.9 g/dl (6.4-8.2)   19  07:00    


 


Albumin  3.1 g/dl (3.4-5.0)  L  19  07:00    


 


RPR Titer  Nonreactive  (NONREACTIVE)   19  07:00    


 


HIV 1&2 Antibody Screen  Negative   19  07:00    


 


HIV P24 Antigen  Negative   19  07:00    








lab noted


Assessment: 





19 14:42


withdrawal sx


Plan: 





continue detox

## 2019-03-06 RX ADMIN — METFORMIN HYDROCHLORIDE SCH MG: 500 TABLET ORAL at 17:56

## 2019-03-06 RX ADMIN — NICOTINE SCH: 14 PATCH, EXTENDED RELEASE TRANSDERMAL at 10:38

## 2019-03-06 RX ADMIN — ERYTHROMYCIN SCH APPLIC: 5 OINTMENT OPHTHALMIC at 13:38

## 2019-03-06 RX ADMIN — CEPHALEXIN SCH MG: 500 CAPSULE ORAL at 12:22

## 2019-03-06 RX ADMIN — METFORMIN HYDROCHLORIDE SCH MG: 500 TABLET ORAL at 07:25

## 2019-03-06 RX ADMIN — Medication SCH MG: at 22:30

## 2019-03-06 RX ADMIN — LISINOPRIL SCH MG: 20 TABLET ORAL at 10:37

## 2019-03-06 RX ADMIN — CEPHALEXIN SCH: 500 CAPSULE ORAL at 00:39

## 2019-03-06 RX ADMIN — CEPHALEXIN SCH MG: 500 CAPSULE ORAL at 17:57

## 2019-03-06 RX ADMIN — ATORVASTATIN CALCIUM SCH MG: 40 TABLET, FILM COATED ORAL at 21:57

## 2019-03-06 RX ADMIN — Medication PRN MG: at 21:57

## 2019-03-06 RX ADMIN — Medication SCH TAB: at 10:36

## 2019-03-06 RX ADMIN — CEPHALEXIN SCH MG: 500 CAPSULE ORAL at 05:19

## 2019-03-06 NOTE — PN
BHS COWS





- Scale


Resting Pulse: 0= [place] 80 or Below


Sweatin= Chills/Flushing


Restless Observation: 0= Sits Still


Pupil Size: 0= Normal to Room Light


Bone or Joint Aches: 1= Mild Discomfort


Runny Nose/ Eye Tearin= Nasal Congestion


GI Upset > 30mins: 1= Stomach Cramp


Tremor Observation of Outstretched Hands: 1= Tremor Felt, Not Seen


Yawning Observation: 1= 1-2x During Session


Anxiety or Irritability: 1=Feels Anxious/Irritable


Goose Flesh Skin: 0=Smooth Skin


COWS Score: 7





BHS Progress Note (SOAP)


Subjective: 





body aches back pain both eyes redness right eye white discharge trouble open 

eye lid


Objective: 





19 11:48


 Vital Signs











Temperature  97.1 F L  19 09:40


 


Pulse Rate  68   19 09:40


 


Respiratory Rate  18   19 09:40


 


Blood Pressure  127/78   19 09:40


 


O2 Sat by Pulse Oximetry (%)      








 Laboratory Last Values











WBC  4.8 K/mm3 (4.0-10.0)   19  07:00    


 


RBC  4.39 M/mm3 (4.00-5.60)   19  07:00    


 


Hgb  12.3 GM/dL (11.7-16.9)   19  07:00    


 


Hct  37.3 % (35.4-49)   19  07:00    


 


MCV  85.0 fl (80-96)   19  07:00    


 


MCH  28.1 pg (25.7-33.7)   19  07:00    


 


MCHC  33.1 g/dl (32.0-35.9)   19  07:00    


 


RDW  14.6 % (11.9-15.9)   19  07:00    


 


Plt Count  188 K/MM3 (134-434)  D 19  07:00    


 


MPV  7.6 fl (7.5-11.1)   19  07:00    


 


Sodium  136 mmol/L (136-145)   19  07:00    


 


Potassium  4.3 mmol/L (3.5-5.1)   19  07:00    


 


Chloride  102 mmol/L ()   19  07:00    


 


Carbon Dioxide  27 mmol/L (21-32)   19  07:00    


 


Anion Gap  7 MMOL/L (8-16)  L  19  07:00    


 


BUN  16 mg/dL (7-18)   19  07:00    


 


Creatinine  0.7 mg/dL (0.55-1.3)   19  07:00    


 


Creat Clearance w eGFR  > 60  (>60)   19  07:00    


 


POC Glucometer  148 UNITS ()   19  05:21    


 


Random Glucose  180 mg/dL ()  H  19  07:00    


 


Calcium  8.4 mg/dL (8.5-10.1)  L  19  07:00    


 


Total Bilirubin  0.6 mg/dL (0.2-1)   19  07:00    


 


AST  22 U/L (15-37)   19  07:00    


 


ALT  26 U/L (13-61)   19  07:00    


 


Alkaline Phosphatase  154 U/L ()  H  19  07:00    


 


Total Protein  6.9 g/dl (6.4-8.2)   19  07:00    


 


Albumin  3.1 g/dl (3.4-5.0)  L  19  07:00    


 


Urine Color  Yellow   19  23:20    


 


Urine Appearance  Turbid   19  23:20    


 


Urine pH  5.0  (5.0-8.0)   19  23:20    


 


Ur Specific Gravity  1.028  (1.010-1.035)   19  23:20    


 


Urine Protein  Negative  (NEGATIVE)   19  23:20    


 


Urine Glucose (UA)  Negative  (NEGATIVE)   19  23:20    


 


Urine Ketones  Negative  (NEGATIVE)   19  23:20    


 


Urine Blood  Negative  (NEGATIVE)   19  23:20    


 


Urine Nitrite  Negative  (NEGATIVE)   19  23:20    


 


Urine Bilirubin  Negative  (<2.0 mg/dL)   19  23:20    


 


Urine Urobilinogen  2.0 mg/dL (0.2-1.0)   19  23:20    


 


Ur Leukocyte Esterase  Negative  (NEGATIVE)   19  23:20    


 


RPR Titer  Nonreactive  (NONREACTIVE)   19  07:00    


 


HIV 1&2 Antibody Screen  Negative   19  07:00    


 


HIV P24 Antigen  Negative   19  07:00    








lab noted


Assessment: 





19 11:48


opiate withdrawal sx


bacterial conjunctivitis


Plan: 





continue detox

## 2019-03-07 RX ADMIN — LISINOPRIL SCH MG: 20 TABLET ORAL at 10:27

## 2019-03-07 RX ADMIN — CEPHALEXIN SCH MG: 500 CAPSULE ORAL at 23:04

## 2019-03-07 RX ADMIN — CEPHALEXIN SCH MG: 500 CAPSULE ORAL at 18:15

## 2019-03-07 RX ADMIN — CEPHALEXIN SCH MG: 500 CAPSULE ORAL at 11:00

## 2019-03-07 RX ADMIN — METFORMIN HYDROCHLORIDE SCH MG: 500 TABLET ORAL at 17:18

## 2019-03-07 RX ADMIN — Medication SCH MG: at 22:25

## 2019-03-07 RX ADMIN — CEPHALEXIN SCH MG: 500 CAPSULE ORAL at 00:00

## 2019-03-07 RX ADMIN — CEPHALEXIN SCH MG: 500 CAPSULE ORAL at 05:17

## 2019-03-07 RX ADMIN — ERYTHROMYCIN SCH APPLIC: 5 OINTMENT OPHTHALMIC at 10:24

## 2019-03-07 RX ADMIN — METFORMIN HYDROCHLORIDE SCH MG: 500 TABLET ORAL at 06:18

## 2019-03-07 RX ADMIN — Medication SCH TAB: at 10:23

## 2019-03-07 RX ADMIN — ATORVASTATIN CALCIUM SCH MG: 40 TABLET, FILM COATED ORAL at 22:25

## 2019-03-07 RX ADMIN — NICOTINE SCH: 14 PATCH, EXTENDED RELEASE TRANSDERMAL at 10:27

## 2019-03-07 RX ADMIN — Medication PRN MG: at 22:25

## 2019-03-07 NOTE — PN
BHS COWS





- Scale


Resting Pulse: 0= [place] 80 or Below


Sweatin= No chills or Flushing


Restless Observation: 0= Sits Still


Pupil Size: 0= Normal to Room Light


Bone or Joint Aches: 1= Mild Discomfort


Runny Nose/ Eye Tearin= None


GI Upset > 30mins: 0= None


Tremor Observation of Outstretched Hands: 1= Tremor Felt, Not Seen


Yawning Observation: 1= 1-2x During Session


Anxiety or Irritability: 1=Feels Anxious/Irritable


Goose Flesh Skin: 0=Smooth Skin


COWS Score: 4





BHS Progress Note (SOAP)


Subjective: 





feeling better less body ache mild tremor little sweating sleep better at night 


talking about aftercare preferred begin opiate rehab tomorrow


Objective: 





19 16:23


 Vital Signs











Temperature  97.6 F   19 13:25


 


Pulse Rate  63   19 13:25


 


Respiratory Rate  18   19 13:25


 


Blood Pressure  142/76   19 13:25


 


O2 Sat by Pulse Oximetry (%)      








 Laboratory Last Values











WBC  4.8 K/mm3 (4.0-10.0)   19  07:00    


 


RBC  4.39 M/mm3 (4.00-5.60)   19  07:00    


 


Hgb  12.3 GM/dL (11.7-16.9)   19  07:00    


 


Hct  37.3 % (35.4-49)   19  07:00    


 


MCV  85.0 fl (80-96)   19  07:00    


 


MCH  28.1 pg (25.7-33.7)   19  07:00    


 


MCHC  33.1 g/dl (32.0-35.9)   19  07:00    


 


RDW  14.6 % (11.9-15.9)   19  07:00    


 


Plt Count  188 K/MM3 (134-434)  D 19  07:00    


 


MPV  7.6 fl (7.5-11.1)   19  07:00    


 


Sodium  136 mmol/L (136-145)   19  07:00    


 


Potassium  4.3 mmol/L (3.5-5.1)   19  07:00    


 


Chloride  102 mmol/L ()   19  07:00    


 


Carbon Dioxide  27 mmol/L (21-32)   19  07:00    


 


Anion Gap  7 MMOL/L (8-16)  L  19  07:00    


 


BUN  16 mg/dL (7-18)   19  07:00    


 


Creatinine  0.7 mg/dL (0.55-1.3)   19  07:00    


 


Creat Clearance w eGFR  > 60  (>60)   19  07:00    


 


POC Glucometer  189 UNITS ()   19  05:16    


 


Random Glucose  180 mg/dL ()  H  19  07:00    


 


Calcium  8.4 mg/dL (8.5-10.1)  L  19  07:00    


 


Total Bilirubin  0.6 mg/dL (0.2-1)   19  07:00    


 


AST  22 U/L (15-37)   19  07:00    


 


ALT  26 U/L (13-61)   19  07:00    


 


Alkaline Phosphatase  154 U/L ()  H  19  07:00    


 


Total Protein  6.9 g/dl (6.4-8.2)   19  07:00    


 


Albumin  3.1 g/dl (3.4-5.0)  L  19  07:00    


 


Urine Color  Yellow   19  23:20    


 


Urine Appearance  Turbid   19  23:20    


 


Urine pH  5.0  (5.0-8.0)   19  23:20    


 


Ur Specific Gravity  1.028  (1.010-1.035)   19  23:20    


 


Urine Protein  Negative  (NEGATIVE)   19  23:20    


 


Urine Glucose (UA)  Negative  (NEGATIVE)   19  23:20    


 


Urine Ketones  Negative  (NEGATIVE)   19  23:20    


 


Urine Blood  Negative  (NEGATIVE)   19  23:20    


 


Urine Nitrite  Negative  (NEGATIVE)   19  23:20    


 


Urine Bilirubin  Negative  (<2.0 mg/dL)   19  23:20    


 


Urine Urobilinogen  2.0 mg/dL (0.2-1.0)   19  23:20    


 


Ur Leukocyte Esterase  Negative  (NEGATIVE)   19  23:20    


 


RPR Titer  Nonreactive  (NONREACTIVE)   19  07:00    


 


HIV 1&2 Antibody Screen  Negative   19  07:00    


 


HIV P24 Antigen  Negative   19  07:00    








lab noted


Assessment: 





19 16:24


mild withdrawal sx


encourage  narcan kit from pharmacy


Plan: 





continue detox

## 2019-03-08 VITALS — TEMPERATURE: 97 F | SYSTOLIC BLOOD PRESSURE: 128 MMHG | HEART RATE: 58 BPM | DIASTOLIC BLOOD PRESSURE: 70 MMHG

## 2019-03-08 RX ADMIN — CEPHALEXIN SCH MG: 500 CAPSULE ORAL at 05:52

## 2019-03-08 RX ADMIN — METFORMIN HYDROCHLORIDE SCH MG: 500 TABLET ORAL at 06:38

## 2019-03-10 NOTE — DS
[facility] Detox Discharge Summary


Admission Date: 


03/04/19





Discharge Date: 03/08/19





- History


Present History: Opioid Dependence


Additional Comments: 





PATIENT GOING TO [facility] ([place], [place]) FOR AFTERCARE. PRESCRIPTIONS FOR DISCHARGE MEDICATIONS, INCLUDING 

KELFLEX FOR CELLULITIS OF RIGHT FOREARM, SENT TO PATIENT'S PHARMACY ([facility], [place], [place]) FOR FOLLOW-UP AFTERCARE). PATIENT WAS DISCHARGED 

FROM DETOX UNIT IN STABLE MEDICAL CONDITION.


Pertinent Past History: 





Type II DM, HTN, Hyperlipidemia, History of Depression, Hep C, Conjunctivitis 

of Bilateral Eyes, Insomnia, Dry Eyes, Nicotine Dependence.





- Physical Exam Results


Vital Signs: 


 Vital Signs











Temperature  97 F L  03/08/19 06:38


 


Pulse Rate  58 L  03/08/19 06:38


 


Respiratory Rate  18   03/08/19 06:38


 


Blood Pressure  128/70   03/08/19 06:38


 


O2 Sat by Pulse Oximetry (%)      











Pertinent Admission Physical Exam Findings: 





WITHDRAWAL SYMPTOMS.





 Laboratory Tests











  03/04/19 03/04/19 03/05/19





  19:02 23:20 05:11


 


WBC   


 


RBC   


 


Hgb   


 


Hct   


 


MCV   


 


MCH   


 


MCHC   


 


RDW   


 


Plt Count   


 


MPV   


 


Sodium   


 


Potassium   


 


Chloride   


 


Carbon Dioxide   


 


Anion Gap   


 


BUN   


 


Creatinine   


 


Creat Clearance w eGFR   


 


POC Glucometer  168   232


 


Random Glucose   


 


Calcium   


 


Total Bilirubin   


 


AST   


 


ALT   


 


Alkaline Phosphatase   


 


Total Protein   


 


Albumin   


 


Urine Color   Yellow 


 


Urine Appearance   Turbid 


 


Urine pH   5.0 


 


Ur Specific Gravity   1.028 


 


Urine Protein   Negative 


 


Urine Glucose (UA)   Negative 


 


Urine Ketones   Negative 


 


Urine Blood   Negative 


 


Urine Nitrite   Negative 


 


Urine Bilirubin   Negative 


 


Urine Urobilinogen   2.0 


 


Ur Leukocyte Esterase   Negative 


 


RPR Titer   


 


HIV 1&2 Antibody Screen   


 


HIV P24 Antigen   














  03/05/19 03/05/19 03/05/19





  07:00 07:00 07:00


 


WBC   4.8 


 


RBC   4.39 


 


Hgb   12.3 


 


Hct   37.3 


 


MCV   85.0 


 


MCH   28.1 


 


MCHC   33.1 


 


RDW   14.6 


 


Plt Count   188  D 


 


MPV   7.6 


 


Sodium    136


 


Potassium    4.3


 


Chloride    102


 


Carbon Dioxide    27


 


Anion Gap    7 L


 


BUN    16


 


Creatinine    0.7


 


Creat Clearance w eGFR    > 60


 


POC Glucometer   


 


Random Glucose    180 H


 


Calcium    8.4 L


 


Total Bilirubin    0.6


 


AST    22


 


ALT    26


 


Alkaline Phosphatase    154 H


 


Total Protein    6.9


 


Albumin    3.1 L


 


Urine Color   


 


Urine Appearance   


 


Urine pH   


 


Ur Specific Gravity   


 


Urine Protein   


 


Urine Glucose (UA)   


 


Urine Ketones   


 


Urine Blood   


 


Urine Nitrite   


 


Urine Bilirubin   


 


Urine Urobilinogen   


 


Ur Leukocyte Esterase   


 


RPR Titer   


 


HIV 1&2 Antibody Screen  Negative  


 


HIV P24 Antigen  Negative  














  03/05/19 03/05/19 03/06/19





  07:00 16:33 05:21


 


WBC   


 


RBC   


 


Hgb   


 


Hct   


 


MCV   


 


MCH   


 


MCHC   


 


RDW   


 


Plt Count   


 


MPV   


 


Sodium   


 


Potassium   


 


Chloride   


 


Carbon Dioxide   


 


Anion Gap   


 


BUN   


 


Creatinine   


 


Creat Clearance w eGFR   


 


POC Glucometer   205  148


 


Random Glucose   


 


Calcium   


 


Total Bilirubin   


 


AST   


 


ALT   


 


Alkaline Phosphatase   


 


Total Protein   


 


Albumin   


 


Urine Color   


 


Urine Appearance   


 


Urine pH   


 


Ur Specific Gravity   


 


Urine Protein   


 


Urine Glucose (UA)   


 


Urine Ketones   


 


Urine Blood   


 


Urine Nitrite   


 


Urine Bilirubin   


 


Urine Urobilinogen   


 


Ur Leukocyte Esterase   


 


RPR Titer  Nonreactive  


 


HIV 1&2 Antibody Screen   


 


HIV P24 Antigen   














  03/06/19 03/07/19 03/07/19





  17:14 05:16 16:25


 


WBC   


 


RBC   


 


Hgb   


 


Hct   


 


MCV   


 


MCH   


 


MCHC   


 


RDW   


 


Plt Count   


 


MPV   


 


Sodium   


 


Potassium   


 


Chloride   


 


Carbon Dioxide   


 


Anion Gap   


 


BUN   


 


Creatinine   


 


Creat Clearance w eGFR   


 


POC Glucometer  288  189  276


 


Random Glucose   


 


Calcium   


 


Total Bilirubin   


 


AST   


 


ALT   


 


Alkaline Phosphatase   


 


Total Protein   


 


Albumin   


 


Urine Color   


 


Urine Appearance   


 


Urine pH   


 


Ur Specific Gravity   


 


Urine Protein   


 


Urine Glucose (UA)   


 


Urine Ketones   


 


Urine Blood   


 


Urine Nitrite   


 


Urine Bilirubin   


 


Urine Urobilinogen   


 


Ur Leukocyte Esterase   


 


RPR Titer   


 


HIV 1&2 Antibody Screen   


 


HIV P24 Antigen   














  03/08/19





  05:51


 


WBC 


 


RBC 


 


Hgb 


 


Hct 


 


MCV 


 


MCH 


 


MCHC 


 


RDW 


 


Plt Count 


 


MPV 


 


Sodium 


 


Potassium 


 


Chloride 


 


Carbon Dioxide 


 


Anion Gap 


 


BUN 


 


Creatinine 


 


Creat Clearance w eGFR 


 


POC Glucometer  376


 


Random Glucose 


 


Calcium 


 


Total Bilirubin 


 


AST 


 


ALT 


 


Alkaline Phosphatase 


 


Total Protein 


 


Albumin 


 


Urine Color 


 


Urine Appearance 


 


Urine pH 


 


Ur Specific Gravity 


 


Urine Protein 


 


Urine Glucose (UA) 


 


Urine Ketones 


 


Urine Blood 


 


Urine Nitrite 


 


Urine Bilirubin 


 


Urine Urobilinogen 


 


Ur Leukocyte Esterase 


 


RPR Titer 


 


HIV 1&2 Antibody Screen 


 


HIV P24 Antigen 








LABS NOTED.





- Treatment


Hospital Course: Detox Protocol Followed, Detoxed Safely, Responded well, 

Discharged Condition Good


Patient has Accepted a Rehab Referral to: PT. GOING TO [facility] 

[facility] ([place], [place]).





- Medication


Discharge Medications: 


Ambulatory Orders





Ciprofloxacin 0.3% Eye Drops [Ciloxan 0.3% Eye Drops --] 1 drop OU BID 03/21/18 


Quetiapine Fumarate [Seroquel -] 200 mg PO HS #30 tab 03/21/18 


Zolpidem Tartrate [Ambien] 10 mg PO HS 10/05/18 


Atorvastatin Ca [Lipitor] 40 mg PO HS #14 tablet 03/07/19 


Lisinopril 1 tab PO DAILY #14 tablet 03/07/19 


Naloxone HCl [Narcan] 4 mg NS ASDIR PRN #1 spray 03/07/19 


metFORMIN HCL [Glucophage -] 500 mg PO BID #30 tablet 03/07/19 


Cephalexin [Keflex] 500 mg PO Q6H 5 Days #20 capsule 03/08/19 











- Diagnosis


(1) Abscess of right forearm


Status: Acute   





(2) Opioid dependence with withdrawal


Status: Acute   





(3) Substance induced mood disorder


Status: Acute   





(4) Substance-induced sleep disorder


Status: Acute   





(5) Conjunctivitis, both eyes


Status: Chronic   


Qualifiers: 


   Conjunctivitis type: acute   Acute conjunctivitis type: bacterial   

Qualified Code(s): H10.33 - Unspecified acute conjunctivitis, bilateral   





(6) Dry eye


Status: Chronic   





(7) HLD (hyperlipidemia)


Status: Chronic   


Qualifiers: 


   Hyperlipidemia type: pure hypercholesterolemia   Qualified Code(s): E78.00 - 

Pure hypercholesterolemia, unspecified; E78.0 - Pure hypercholesterolemia   





(8) Hepatitis C


Status: Chronic   


Qualifiers: 


   Viral hepatitis chronicity: chronic   Hepatic coma status: without hepatic 

coma   Qualified Code(s): B18.2 - Chronic viral hepatitis C   





(9) Hypertension


Status: Chronic   


Qualifiers: 


   Hypertension type: essential hypertension   Qualified Code(s): I10 - 

Essential (primary) hypertension   





(10) Insomnia


Status: Chronic   


Qualifiers: 


   Insomnia type: drug-induced   Qualified Code(s): F19.982 - Other 

psychoactive substance use, unspecified with psychoactive substance-induced 

sleep disorder   





(11) Nicotine dependence


Status: Chronic   


Qualifiers: 


   Nicotine product type: cigarettes   Substance use status: uncomplicated   

Qualified Code(s): F17.210 - Nicotine dependence, cigarettes, uncomplicated   





(12) Type 2 diabetes mellitus


Status: Chronic   


Qualifiers: 


   Diabetes mellitus long term insulin use: with long term use   Diabetes 

mellitus complication status: without complication   Qualified Code(s): E11.9 - 

Type 2 diabetes mellitus without complications; Z79.4 - Long term (current) use 

of insulin   





- AMA


Did Patient Leave Against Medical Advice: No

## 2020-02-25 NOTE — PN
[facility] Progress Note (SOAP)


Subjective: 


ANXIETY,SWEATS,DRY EYES.


Objective: 





06/15/17 11:39


 Vital Signs











Temperature  99.5 F   06/15/17 09:07


 


Pulse Rate  77   06/15/17 09:07


 


Respiratory Rate  18   06/15/17 09:07


 


Blood Pressure  112/76   06/15/17 09:07


 


O2 Sat by Pulse Oximetry (%)      








 Laboratory Last Values











WBC  6.3 K/mm3 (4.0-10.0)   06/13/17  07:00    


 


RBC  4.97 M/mm3 (4.00-5.60)   06/13/17  07:00    


 


Hgb  14.1 GM/dL (11.7-16.9)   06/13/17  07:00    


 


Hct  43.3 % (35.4-49)   06/13/17  07:00    


 


MCV  87.1 fl (80-96)   06/13/17  07:00    


 


MCHC  32.6 g/dl (32.0-35.9)   06/13/17  07:00    


 


RDW  14.3 % (11.9-15.9)   06/13/17  07:00    


 


Plt Count  228 K/MM3 (134-434)   06/13/17  07:00    


 


MPV  7.3 fl (7.5-11.1)  L D 06/13/17  07:00    


 


Sodium  136 mmol/L (136-145)   06/13/17  07:00    


 


Potassium  4.5 mmol/L (3.5-5.1)   06/13/17  07:00    


 


Chloride  99 mmol/L ()   06/13/17  07:00    


 


Carbon Dioxide  28 mmol/L (21-32)   06/13/17  07:00    


 


Anion Gap  9  (8-16)   06/13/17  07:00    


 


BUN  24 mg/dL (7-18)  H D 06/13/17  07:00    


 


Creatinine  0.9 mg/dL (0.7-1.3)   06/13/17  07:00    


 


Creat Clearance w eGFR  > 60  (>60)   06/13/17  07:00    


 


POC Glucometer  175 UNITS (())   06/15/17  05:39    


 


Random Glucose  158 mg/dL ()  H D 06/13/17  07:00    


 


Calcium  9.7 mg/dL (8.5-10.1)   06/13/17  07:00    


 


Total Bilirubin  0.7 mg/dL (0.2-1.0)   06/13/17  07:00    


 


AST  19 U/L (15-37)   06/13/17  07:00    


 


ALT  31 U/L (12-78)  D 06/13/17  07:00    


 


Alkaline Phosphatase  128 U/L ()  H  06/13/17  07:00    


 


Total Protein  7.7 g/dl (6.4-8.2)   06/13/17  07:00    


 


Albumin  3.9 g/dl (3.4-5.0)   06/13/17  07:00    


 


Urine Color  Ltyellow   06/13/17  13:23    


 


Urine Appearance  Clear   06/13/17  13:23    


 


Urine pH  5.0  (5.0-8.0)   06/13/17  13:23    


 


Ur Specific Gravity  1.025  (1.005-1.025)   06/13/17  13:23    


 


Urine Protein  Negative  (NEGATIVE)   06/13/17  13:23    


 


Urine Glucose (UA)  3+  (NEGATIVE)  H  06/13/17  13:23    


 


Urine Ketones  Negative  (NEGATIVE)   06/13/17  13:23    


 


Urine Blood  Negative  (NEGATIVE)   06/13/17  13:23    


 


Urine Nitrite  Negative  (NEGATIVE)   06/13/17  13:23    


 


Urine Bilirubin  Negative  (NEGATIVE)   06/13/17  13:23    


 


Urine Urobilinogen  Negative E.U./dl (0.2-1.0)   06/13/17  13:23    


 


Ur Leukocyte Esterase  Negative  (NEGATIVE)   06/13/17  13:23    


 


RPR Titer  Nonreactive  (NONREACTIVE)   06/13/17  07:00    


 


Hepatitis C Antibody  6.4 s/co ratio (0.0-0.9)  H  06/12/17  07:00    











Assessment: 





06/15/17 11:39


WITHDRAWAL SX


Plan: 


CONTINUE DETOX abdominal pain

## 2023-02-09 NOTE — EKG
Test Reason : 

Blood Pressure : ***/*** mmHG

Vent. Rate : 058 BPM     Atrial Rate : 058 BPM

   P-R Int : 174 ms          QRS Dur : 084 ms

    QT Int : 408 ms       P-R-T Axes : 042 078 066 degrees

   QTc Int : 400 ms

 

SINUS BRADYCARDIA WITH SINUS ARRHYTHMIA

OTHERWISE NORMAL ECG

WHEN COMPARED WITH ECG OF 21-MAR-2018 00:53,

NO SIGNIFICANT CHANGE WAS FOUND

Confirmed by KRYSTIN MEZA MD ([id number]) on 6/15/2018 9:47:21 AM

 

Referred By:             Confirmed By:KRYSTIN MEZA MD [[email or web address]] : Patient complains of redness to his eyes starting on Sunday with increasing symptoms.  Increasing tears.  No vision changes no pain.  No recent exposure to any changes in environment chemicals or fumes.\par Continues on medications for hypertension and colitis without any changes\par No overall changes in functional or exertional ability\par